# Patient Record
Sex: FEMALE | Race: WHITE | NOT HISPANIC OR LATINO | URBAN - METROPOLITAN AREA
[De-identification: names, ages, dates, MRNs, and addresses within clinical notes are randomized per-mention and may not be internally consistent; named-entity substitution may affect disease eponyms.]

---

## 2022-03-21 ENCOUNTER — OUTPATIENT (OUTPATIENT)
Dept: OUTPATIENT SERVICES | Facility: HOSPITAL | Age: 27
LOS: 1 days | Discharge: HOME | End: 2022-03-21

## 2022-03-21 DIAGNOSIS — N97.9 FEMALE INFERTILITY, UNSPECIFIED: ICD-10-CM

## 2023-10-19 PROBLEM — R11.2 NAUSEA AND VOMITING: Status: ACTIVE | Noted: 2023-10-19

## 2023-10-19 PROBLEM — N39.0 UTI (URINARY TRACT INFECTION): Status: ACTIVE | Noted: 2023-10-19

## 2023-10-19 PROBLEM — Z91.89 AT RISK FOR GENETIC DISORDER: Status: ACTIVE | Noted: 2023-10-19

## 2023-10-19 PROBLEM — N92.6 IRREGULAR MENSTRUAL CYCLE: Status: ACTIVE | Noted: 2023-10-19

## 2023-10-19 PROBLEM — R12 HEARTBURN IN PREGNANCY (HHS-HCC): Status: ACTIVE | Noted: 2023-10-19

## 2023-10-19 PROBLEM — A59.9 TRICHOMONIASIS: Status: ACTIVE | Noted: 2023-10-19

## 2023-10-19 PROBLEM — O44.40 LOW-LYING PLACENTA WITHOUT HEMORRHAGE (HHS-HCC): Status: ACTIVE | Noted: 2023-10-19

## 2023-10-19 PROBLEM — F12.20 TETRAHYDROCANNABINOL (THC) DEPENDENCE (MULTI): Status: ACTIVE | Noted: 2023-10-19

## 2023-10-19 PROBLEM — O26.899 HEARTBURN IN PREGNANCY (HHS-HCC): Status: ACTIVE | Noted: 2023-10-19

## 2023-10-19 RX ORDER — MEDROXYPROGESTERONE ACETATE 150 MG/ML
INJECTION, SUSPENSION INTRAMUSCULAR
COMMUNITY
Start: 2022-02-15 | End: 2024-03-13 | Stop reason: ALTCHOICE

## 2023-10-19 RX ORDER — IBUPROFEN 600 MG/1
TABLET ORAL
COMMUNITY
Start: 2022-02-07

## 2023-10-19 RX ORDER — ONDANSETRON 4 MG/1
1-2 TABLET, FILM COATED ORAL EVERY 8 HOURS PRN
COMMUNITY

## 2023-10-19 RX ORDER — CHOLECALCIFEROL (VITAMIN D3) 25 MCG
1 TABLET,CHEWABLE ORAL DAILY
COMMUNITY
Start: 2021-07-14

## 2023-10-27 ENCOUNTER — APPOINTMENT (OUTPATIENT)
Dept: OBSTETRICS AND GYNECOLOGY | Facility: CLINIC | Age: 28
End: 2023-10-27
Payer: COMMERCIAL

## 2024-03-02 ENCOUNTER — HOSPITAL ENCOUNTER (EMERGENCY)
Facility: HOSPITAL | Age: 29
Discharge: HOME | End: 2024-03-02
Payer: COMMERCIAL

## 2024-03-02 VITALS
RESPIRATION RATE: 20 BRPM | WEIGHT: 165 LBS | SYSTOLIC BLOOD PRESSURE: 127 MMHG | TEMPERATURE: 98.2 F | OXYGEN SATURATION: 100 % | DIASTOLIC BLOOD PRESSURE: 87 MMHG | HEART RATE: 96 BPM | BODY MASS INDEX: 29.23 KG/M2 | HEIGHT: 63 IN

## 2024-03-02 DIAGNOSIS — R10.13 EPIGASTRIC ABDOMINAL PAIN: Primary | ICD-10-CM

## 2024-03-02 LAB
APPEARANCE UR: ABNORMAL
BACTERIA #/AREA URNS AUTO: ABNORMAL /HPF
BILIRUB UR STRIP.AUTO-MCNC: ABNORMAL MG/DL
COLOR UR: ABNORMAL
GLUCOSE UR STRIP.AUTO-MCNC: NEGATIVE MG/DL
HCG UR QL IA.RAPID: NEGATIVE
HOLD SPECIMEN: 293
KETONES UR STRIP.AUTO-MCNC: NEGATIVE MG/DL
LEUKOCYTE ESTERASE UR QL STRIP.AUTO: NEGATIVE
MUCOUS THREADS #/AREA URNS AUTO: ABNORMAL /LPF
NITRITE UR QL STRIP.AUTO: NEGATIVE
PH UR STRIP.AUTO: 5 [PH]
PROT UR STRIP.AUTO-MCNC: ABNORMAL MG/DL
RBC # UR STRIP.AUTO: ABNORMAL /UL
RBC #/AREA URNS AUTO: ABNORMAL /HPF
SP GR UR STRIP.AUTO: 1.04
SQUAMOUS #/AREA URNS AUTO: ABNORMAL /HPF
UROBILINOGEN UR STRIP.AUTO-MCNC: <2 MG/DL
WBC #/AREA URNS AUTO: ABNORMAL /HPF

## 2024-03-02 PROCEDURE — 2500000001 HC RX 250 WO HCPCS SELF ADMINISTERED DRUGS (ALT 637 FOR MEDICARE OP): Performed by: PHYSICIAN ASSISTANT

## 2024-03-02 PROCEDURE — 81025 URINE PREGNANCY TEST: CPT | Performed by: PHYSICIAN ASSISTANT

## 2024-03-02 PROCEDURE — 81001 URINALYSIS AUTO W/SCOPE: CPT | Performed by: PHYSICIAN ASSISTANT

## 2024-03-02 PROCEDURE — 99283 EMERGENCY DEPT VISIT LOW MDM: CPT

## 2024-03-02 RX ORDER — LIDOCAINE HYDROCHLORIDE 20 MG/ML
15 SOLUTION OROPHARYNGEAL ONCE
Status: COMPLETED | OUTPATIENT
Start: 2024-03-02 | End: 2024-03-02

## 2024-03-02 RX ORDER — ALUMINUM HYDROXIDE, MAGNESIUM HYDROXIDE, AND SIMETHICONE 1200; 120; 1200 MG/30ML; MG/30ML; MG/30ML
15 SUSPENSION ORAL EVERY 6 HOURS PRN
Qty: 355 ML | Refills: 0 | Status: SHIPPED | OUTPATIENT
Start: 2024-03-02 | End: 2024-03-13 | Stop reason: ALTCHOICE

## 2024-03-02 RX ORDER — PANTOPRAZOLE SODIUM 40 MG/1
40 TABLET, DELAYED RELEASE ORAL
Qty: 10 TABLET | Refills: 0 | Status: SHIPPED | OUTPATIENT
Start: 2024-03-02 | End: 2024-03-13 | Stop reason: ALTCHOICE

## 2024-03-02 RX ORDER — ALUMINUM HYDROXIDE, MAGNESIUM HYDROXIDE, AND SIMETHICONE 1200; 120; 1200 MG/30ML; MG/30ML; MG/30ML
30 SUSPENSION ORAL ONCE
Status: COMPLETED | OUTPATIENT
Start: 2024-03-02 | End: 2024-03-02

## 2024-03-02 RX ADMIN — ALUMINUM HYDROXIDE, MAGNESIUM HYDROXIDE, AND DIMETHICONE 30 ML: 200; 20; 200 SUSPENSION ORAL at 13:59

## 2024-03-02 RX ADMIN — LIDOCAINE HYDROCHLORIDE 15 ML: 20 SOLUTION ORAL at 13:59

## 2024-03-02 ASSESSMENT — LIFESTYLE VARIABLES
EVER FELT BAD OR GUILTY ABOUT YOUR DRINKING: NO
EVER HAD A DRINK FIRST THING IN THE MORNING TO STEADY YOUR NERVES TO GET RID OF A HANGOVER: NO
HAVE PEOPLE ANNOYED YOU BY CRITICIZING YOUR DRINKING: NO
HAVE YOU EVER FELT YOU SHOULD CUT DOWN ON YOUR DRINKING: NO

## 2024-03-02 ASSESSMENT — PAIN DESCRIPTION - LOCATION: LOCATION: ABDOMEN

## 2024-03-02 ASSESSMENT — COLUMBIA-SUICIDE SEVERITY RATING SCALE - C-SSRS
1. IN THE PAST MONTH, HAVE YOU WISHED YOU WERE DEAD OR WISHED YOU COULD GO TO SLEEP AND NOT WAKE UP?: NO
6. HAVE YOU EVER DONE ANYTHING, STARTED TO DO ANYTHING, OR PREPARED TO DO ANYTHING TO END YOUR LIFE?: NO
2. HAVE YOU ACTUALLY HAD ANY THOUGHTS OF KILLING YOURSELF?: NO

## 2024-03-02 ASSESSMENT — PAIN SCALES - GENERAL
PAINLEVEL_OUTOF10: 2
PAINLEVEL_OUTOF10: 5 - MODERATE PAIN

## 2024-03-02 ASSESSMENT — PAIN - FUNCTIONAL ASSESSMENT: PAIN_FUNCTIONAL_ASSESSMENT: 0-10

## 2024-03-02 ASSESSMENT — PAIN DESCRIPTION - PAIN TYPE: TYPE: ACUTE PAIN

## 2024-03-02 ASSESSMENT — PAIN DESCRIPTION - ORIENTATION: ORIENTATION: LEFT

## 2024-03-02 NOTE — ED PROVIDER NOTES
HPI   Chief Complaint   Patient presents with    Abdominal Pain     C/O left upper quadrant pain and nausea.       History of present illness: 28-year-old female complains of abdominal pain for 1 week.  Says the pain is left upper quadrant and burning.  She took some antiacids yesterday offering some relief.  Has occasional nausea with none at this time.  Occasionally the pain radiates to her left back.  Denies any dysuria, polyuria, veg bleeding or vaginal discharge, diarrhea, constipation, fevers, chills, sweats.  Has also been taking Tylenol intermittently offering some relief of pain.  Patient states her pain began after eating a seafood boil.  She is currently on her menstrual cycle.    Review of systems: Constitutional, eye, ENT, cardiovascular, respiratory, gastrointestinal, genitourinary, neurologic, musculoskeletal, dermatologic, hematologic, endocrine systems were evaluated and were negative unless otherwise specified in history of present illness.    Medications: Reviewed and per nursing note.    Family history: Denies relevant medical conditions.    Past medical history: None per patient    Social history: Denies tobacco, alcohol, drug use.      Physical exam:    Appearance: Well-developed, well-nourished, nontoxic-appearing, alert and oriented x3. Talking in complete sentences.    HEENT:  Head normocephalic atraumatic, extraocular movements intact, pupils equal round reactive to light, mucous membranes are moist and pink.    NECK:  Nml Inspection, no meningismus, no thyromegaly, no lymphadenopathy, no JVD, trachea is midline.    Respiratory: Clear to auscultation bilaterally with normal bilateral excursion. No wheezes, rhonchi, crackles.    Cardiovascular: Regular rate and rhythm, no murmurs, rubs or gallops. Pulses 2+ symmetrically in the dorsalis pedis and radial pulses.    Abdomen/GI: Slight epigastric tenderness with no rebound guarding or rigidity.  Soft, nondistended, normal bowel sounds x4. No  masses or organomegaly.    :  No CVA tenderness    Neuro:  Oriented x 3, Speech Clear, cranial nerves grossly intact. Normal sensation to light touch in all 4 extremities.    Musculoskeletal: Patient spontaneously moves all 4 extremities.    Skin:  No cyanosis, clubbing, edema, open wounds, or rashes.                          No data recorded                   Patient History   Past Medical History:   Diagnosis Date    Other specified health status     No pertinent past medical history     Past Surgical History:   Procedure Laterality Date    OTHER SURGICAL HISTORY  07/27/2021    No history of surgery     No family history on file.  Social History     Tobacco Use    Smoking status: Not on file    Smokeless tobacco: Not on file   Substance Use Topics    Alcohol use: Not on file    Drug use: Not on file       Physical Exam   ED Triage Vitals [03/02/24 1255]   Temperature Heart Rate Respirations BP   36.8 °C (98.2 °F) 96 20 127/87      Pulse Ox Temp Source Heart Rate Source Patient Position   100 % Tympanic -- Sitting      BP Location FiO2 (%)     Left arm --       Physical Exam    ED Course & MDM   Diagnoses as of 03/02/24 1558   Epigastric abdominal pain       Medical Decision Making  Labs Reviewed  URINALYSIS WITH REFLEX CULTURE AND MICROSCOPIC - Abnormal     Color, Urine                  Roopa (*)               Appearance, Urine             Hazy (*)               Specific Gravity, Urine       1.041 (*)               pH, Urine                     5.0                    Protein, Urine                100 (2+) (*)               Glucose, Urine                NEGATIVE                Blood, Urine                  LARGE (3+) (*)               Ketones, Urine                NEGATIVE                Bilirubin, Urine              SMALL (1+) (*)               Urobilinogen, Urine           <2.0                   Nitrite, Urine                NEGATIVE                Leukocyte Esterase, Urine     NEGATIVE             URINALYSIS  MICROSCOPIC WITH REFLEX CULTURE - Abnormal     WBC, Urine                    1-5                    RBC, Urine                    11-20 (*)               Squamous Epithelial Cells, Urine   10-25 (FEW)                Bacteria, Urine               1+ (*)                 Mucus, Urine                  4+                  HCG, URINE, QUALITATIVE - Normal     HCG, Urine                    NEGATIVE             URINALYSIS WITH REFLEX CULTURE AND MICROSCOPIC         Narrative: The following orders were created for panel order Urinalysis with Reflex Culture and Microscopic.                  Procedure                               Abnormality         Status                                     ---------                               -----------         ------                                     Urinalysis with Reflex C...[[id number]]  Abnormal            Final result                               Extra Urine Gray Tube[[id number]]                            In process                                                   Please view results for these tests on the individual orders.  EXTRA URINE GRAY TUBE      Patient presents with abdominal pain in the epigastric region.  Differential diagnosis gastritis, GERD, gastric ulcer, pancreatitis, cholecystitis, appendicitis, bowel obstruction, coronary syndrome.  Patient states it is a burning pain left upper quadrant improved with antiacids.  Slight epigastric tenderness.  Afebrile nontoxic-appearing.    hCG negative.  Urinalysis shows contamination from her menstrual cycle .  Less likely urinary tract infection.  Patient given GI cocktail with complete resolution of symptoms.  Presentation most likely gastritis or GERD.  Given prescription for PPI and Maalox.  Will need to follow-up with her primary provider.  Serial abdominal exam was performed at 1550 revealing no tenderness.  She is tolerating by mouth.    Patient will be discharged to home with prescription.  Patient is educated in  signs and symptoms of worsening symptoms and reasons to come back to the emergency department.  Will need to follow up with primary care provider.  Patient does not report social determinants of health impacting ability to obtain care that is needed.  Patient agrees with plan.    This is a transcription.  Text was reviewed for errors, but some transcription errors may remain.  Please call for any questions.          Procedure  Procedures     Dom Devi PA-C  03/02/24 1552

## 2024-03-07 ENCOUNTER — APPOINTMENT (OUTPATIENT)
Dept: RADIOLOGY | Facility: HOSPITAL | Age: 29
End: 2024-03-07
Payer: COMMERCIAL

## 2024-03-07 ENCOUNTER — HOSPITAL ENCOUNTER (EMERGENCY)
Facility: HOSPITAL | Age: 29
Discharge: HOME | End: 2024-03-07
Attending: EMERGENCY MEDICINE
Payer: COMMERCIAL

## 2024-03-07 VITALS
DIASTOLIC BLOOD PRESSURE: 70 MMHG | BODY MASS INDEX: 29.23 KG/M2 | WEIGHT: 165 LBS | HEIGHT: 63 IN | SYSTOLIC BLOOD PRESSURE: 122 MMHG | RESPIRATION RATE: 20 BRPM | HEART RATE: 72 BPM | OXYGEN SATURATION: 98 % | TEMPERATURE: 98.6 F

## 2024-03-07 DIAGNOSIS — K81.9 CHOLECYSTITIS: Primary | ICD-10-CM

## 2024-03-07 PROBLEM — K81.1 CHRONIC CHOLECYSTITIS: Status: ACTIVE | Noted: 2024-03-07

## 2024-03-07 LAB
ALBUMIN SERPL BCP-MCNC: 3.9 G/DL (ref 3.4–5)
ALP SERPL-CCNC: 52 U/L (ref 33–110)
ALT SERPL W P-5'-P-CCNC: 26 U/L (ref 7–45)
ANION GAP SERPL CALC-SCNC: 9 MMOL/L (ref 10–20)
APPEARANCE UR: ABNORMAL
AST SERPL W P-5'-P-CCNC: 16 U/L (ref 9–39)
BASOPHILS # BLD AUTO: 0.02 X10*3/UL (ref 0–0.1)
BASOPHILS NFR BLD AUTO: 0.3 %
BILIRUB SERPL-MCNC: 0.3 MG/DL (ref 0–1.2)
BILIRUB UR STRIP.AUTO-MCNC: NEGATIVE MG/DL
BUN SERPL-MCNC: 13 MG/DL (ref 6–23)
CALCIUM SERPL-MCNC: 8.7 MG/DL (ref 8.6–10.3)
CHLORIDE SERPL-SCNC: 107 MMOL/L (ref 98–107)
CO2 SERPL-SCNC: 26 MMOL/L (ref 21–32)
COLOR UR: YELLOW
CREAT SERPL-MCNC: 0.69 MG/DL (ref 0.5–1.05)
EGFRCR SERPLBLD CKD-EPI 2021: >90 ML/MIN/1.73M*2
EOSINOPHIL # BLD AUTO: 0.1 X10*3/UL (ref 0–0.7)
EOSINOPHIL NFR BLD AUTO: 1.7 %
ERYTHROCYTE [DISTWIDTH] IN BLOOD BY AUTOMATED COUNT: 12.6 % (ref 11.5–14.5)
GLUCOSE SERPL-MCNC: 109 MG/DL (ref 74–99)
GLUCOSE UR STRIP.AUTO-MCNC: NEGATIVE MG/DL
HCG UR QL IA.RAPID: NEGATIVE
HCT VFR BLD AUTO: 37.5 % (ref 36–46)
HGB BLD-MCNC: 12.7 G/DL (ref 12–16)
HOLD SPECIMEN: 293
IMM GRANULOCYTES # BLD AUTO: 0.01 X10*3/UL (ref 0–0.7)
IMM GRANULOCYTES NFR BLD AUTO: 0.2 % (ref 0–0.9)
KETONES UR STRIP.AUTO-MCNC: NEGATIVE MG/DL
LEUKOCYTE ESTERASE UR QL STRIP.AUTO: NEGATIVE
LIPASE SERPL-CCNC: 44 U/L (ref 9–82)
LYMPHOCYTES # BLD AUTO: 1.72 X10*3/UL (ref 1.2–4.8)
LYMPHOCYTES NFR BLD AUTO: 30 %
MCH RBC QN AUTO: 29.4 PG (ref 26–34)
MCHC RBC AUTO-ENTMCNC: 33.9 G/DL (ref 32–36)
MCV RBC AUTO: 87 FL (ref 80–100)
MONOCYTES # BLD AUTO: 0.27 X10*3/UL (ref 0.1–1)
MONOCYTES NFR BLD AUTO: 4.7 %
NEUTROPHILS # BLD AUTO: 3.62 X10*3/UL (ref 1.2–7.7)
NEUTROPHILS NFR BLD AUTO: 63.1 %
NITRITE UR QL STRIP.AUTO: NEGATIVE
NRBC BLD-RTO: 0 /100 WBCS (ref 0–0)
PH UR STRIP.AUTO: 7 [PH]
PLATELET # BLD AUTO: 295 X10*3/UL (ref 150–450)
POTASSIUM SERPL-SCNC: 3.5 MMOL/L (ref 3.5–5.3)
PROT SERPL-MCNC: 6.7 G/DL (ref 6.4–8.2)
PROT UR STRIP.AUTO-MCNC: NEGATIVE MG/DL
RBC # BLD AUTO: 4.32 X10*6/UL (ref 4–5.2)
RBC # UR STRIP.AUTO: NEGATIVE /UL
SODIUM SERPL-SCNC: 138 MMOL/L (ref 136–145)
SP GR UR STRIP.AUTO: 1.03
UROBILINOGEN UR STRIP.AUTO-MCNC: 2 MG/DL
WBC # BLD AUTO: 5.7 X10*3/UL (ref 4.4–11.3)

## 2024-03-07 PROCEDURE — 74177 CT ABD & PELVIS W/CONTRAST: CPT

## 2024-03-07 PROCEDURE — 2550000001 HC RX 255 CONTRASTS: Performed by: EMERGENCY MEDICINE

## 2024-03-07 PROCEDURE — 85025 COMPLETE CBC W/AUTO DIFF WBC: CPT | Performed by: EMERGENCY MEDICINE

## 2024-03-07 PROCEDURE — 96361 HYDRATE IV INFUSION ADD-ON: CPT | Performed by: EMERGENCY MEDICINE

## 2024-03-07 PROCEDURE — 81025 URINE PREGNANCY TEST: CPT | Performed by: EMERGENCY MEDICINE

## 2024-03-07 PROCEDURE — C9113 INJ PANTOPRAZOLE SODIUM, VIA: HCPCS | Performed by: EMERGENCY MEDICINE

## 2024-03-07 PROCEDURE — 2500000001 HC RX 250 WO HCPCS SELF ADMINISTERED DRUGS (ALT 637 FOR MEDICARE OP): Performed by: STUDENT IN AN ORGANIZED HEALTH CARE EDUCATION/TRAINING PROGRAM

## 2024-03-07 PROCEDURE — 74177 CT ABD & PELVIS W/CONTRAST: CPT | Mod: FOREIGN READ | Performed by: RADIOLOGY

## 2024-03-07 PROCEDURE — 76705 ECHO EXAM OF ABDOMEN: CPT

## 2024-03-07 PROCEDURE — 96375 TX/PRO/DX INJ NEW DRUG ADDON: CPT | Performed by: EMERGENCY MEDICINE

## 2024-03-07 PROCEDURE — 76705 ECHO EXAM OF ABDOMEN: CPT | Mod: FOREIGN READ | Performed by: RADIOLOGY

## 2024-03-07 PROCEDURE — 2500000004 HC RX 250 GENERAL PHARMACY W/ HCPCS (ALT 636 FOR OP/ED): Performed by: EMERGENCY MEDICINE

## 2024-03-07 PROCEDURE — 36415 COLL VENOUS BLD VENIPUNCTURE: CPT | Performed by: EMERGENCY MEDICINE

## 2024-03-07 PROCEDURE — 99285 EMERGENCY DEPT VISIT HI MDM: CPT | Mod: 25 | Performed by: EMERGENCY MEDICINE

## 2024-03-07 PROCEDURE — 84075 ASSAY ALKALINE PHOSPHATASE: CPT | Performed by: EMERGENCY MEDICINE

## 2024-03-07 PROCEDURE — 83690 ASSAY OF LIPASE: CPT | Performed by: EMERGENCY MEDICINE

## 2024-03-07 PROCEDURE — 81003 URINALYSIS AUTO W/O SCOPE: CPT | Performed by: EMERGENCY MEDICINE

## 2024-03-07 PROCEDURE — 96374 THER/PROPH/DIAG INJ IV PUSH: CPT | Performed by: EMERGENCY MEDICINE

## 2024-03-07 RX ORDER — KETOROLAC TROMETHAMINE 30 MG/ML
15 INJECTION, SOLUTION INTRAMUSCULAR; INTRAVENOUS ONCE
Status: COMPLETED | OUTPATIENT
Start: 2024-03-07 | End: 2024-03-07

## 2024-03-07 RX ORDER — IBUPROFEN 600 MG/1
600 TABLET ORAL ONCE
Status: COMPLETED | OUTPATIENT
Start: 2024-03-07 | End: 2024-03-07

## 2024-03-07 RX ORDER — ONDANSETRON HYDROCHLORIDE 2 MG/ML
4 INJECTION, SOLUTION INTRAVENOUS ONCE
Status: COMPLETED | OUTPATIENT
Start: 2024-03-07 | End: 2024-03-07

## 2024-03-07 RX ORDER — OXYCODONE HYDROCHLORIDE 5 MG/1
5 TABLET ORAL EVERY 8 HOURS PRN
Qty: 9 TABLET | Refills: 0 | Status: SHIPPED | OUTPATIENT
Start: 2024-03-07 | End: 2024-03-10

## 2024-03-07 RX ORDER — PANTOPRAZOLE SODIUM 40 MG/10ML
40 INJECTION, POWDER, LYOPHILIZED, FOR SOLUTION INTRAVENOUS ONCE
Status: COMPLETED | OUTPATIENT
Start: 2024-03-07 | End: 2024-03-07

## 2024-03-07 RX ORDER — AMOXICILLIN AND CLAVULANATE POTASSIUM 875; 125 MG/1; MG/1
875 TABLET, FILM COATED ORAL EVERY 12 HOURS
Qty: 14 TABLET | Refills: 0 | Status: SHIPPED | OUTPATIENT
Start: 2024-03-07 | End: 2024-03-14

## 2024-03-07 RX ADMIN — IBUPROFEN 600 MG: 600 TABLET, FILM COATED ORAL at 11:39

## 2024-03-07 RX ADMIN — SODIUM CHLORIDE, POTASSIUM CHLORIDE, SODIUM LACTATE AND CALCIUM CHLORIDE 1000 ML: 600; 310; 30; 20 INJECTION, SOLUTION INTRAVENOUS at 05:29

## 2024-03-07 RX ADMIN — PANTOPRAZOLE SODIUM 40 MG: 40 INJECTION, POWDER, FOR SOLUTION INTRAVENOUS at 05:28

## 2024-03-07 RX ADMIN — ONDANSETRON 4 MG: 2 INJECTION INTRAMUSCULAR; INTRAVENOUS at 05:29

## 2024-03-07 RX ADMIN — KETOROLAC TROMETHAMINE 15 MG: 30 INJECTION, SOLUTION INTRAMUSCULAR; INTRAVENOUS at 05:29

## 2024-03-07 RX ADMIN — IOHEXOL 75 ML: 350 INJECTION, SOLUTION INTRAVENOUS at 06:29

## 2024-03-07 ASSESSMENT — ENCOUNTER SYMPTOMS
NAUSEA: 0
COUGH: 0
EYE DISCHARGE: 0
COLOR CHANGE: 0
SORE THROAT: 0
ABDOMINAL PAIN: 1
APPETITE CHANGE: 1
EYE REDNESS: 0
HEADACHES: 0
VOMITING: 0
FEVER: 0
DIARRHEA: 0
DYSURIA: 0
LIGHT-HEADEDNESS: 0
CHILLS: 0
SHORTNESS OF BREATH: 0
FREQUENCY: 0
ARTHRALGIAS: 0
BACK PAIN: 0
CONSTIPATION: 0

## 2024-03-07 ASSESSMENT — PAIN SCALES - GENERAL
PAINLEVEL_OUTOF10: 8
PAINLEVEL_OUTOF10: 2
PAINLEVEL_OUTOF10: 3
PAINLEVEL_OUTOF10: 0 - NO PAIN
PAINLEVEL_OUTOF10: 8

## 2024-03-07 ASSESSMENT — PAIN DESCRIPTION - PAIN TYPE
TYPE: ACUTE PAIN
TYPE: ACUTE PAIN

## 2024-03-07 ASSESSMENT — PAIN - FUNCTIONAL ASSESSMENT
PAIN_FUNCTIONAL_ASSESSMENT: 0-10

## 2024-03-07 ASSESSMENT — COLUMBIA-SUICIDE SEVERITY RATING SCALE - C-SSRS
6. HAVE YOU EVER DONE ANYTHING, STARTED TO DO ANYTHING, OR PREPARED TO DO ANYTHING TO END YOUR LIFE?: NO
1. IN THE PAST MONTH, HAVE YOU WISHED YOU WERE DEAD OR WISHED YOU COULD GO TO SLEEP AND NOT WAKE UP?: NO
2. HAVE YOU ACTUALLY HAD ANY THOUGHTS OF KILLING YOURSELF?: NO

## 2024-03-07 ASSESSMENT — PAIN DESCRIPTION - DESCRIPTORS: DESCRIPTORS: BURNING

## 2024-03-07 ASSESSMENT — PAIN DESCRIPTION - LOCATION
LOCATION: ABDOMEN

## 2024-03-07 NOTE — CONSULTS
GENERAL SURGERY CONSULT    Patient: Renita José  Room: AC03/03    Age: 28 y.o.   Gender: female  Attending: Margaret Chen DO    MRN: [id number]  Admission Date: 3/7/2024    PCP: No Assigned PCP Generic Provider, MD         SUBJECTIVE     Chief Complaint  Abdominal Pain       HPI  Renita José is a 28 y.o. female on day 0 of admission presenting with abdominal pain. She has been experiencing abdominal pain shortly after eating for the past week. Nothing has made this better and she notes that fatty foods such as cheese sticks make it worse. She has never had pain like this before. She has not had any fevers, chills, nausea, vomiting, or changes in her bowel habits. Her only complaint is epigastric pain at this time.     In the emergency room, she was found to have grossly normal labs including WBC count, LFTS, bilirubin. On CT there was some concern for a thickened gallbladder wall and questionable edema. Ultrasound demonstrated some sludge, a normal CBD diameter, large stone, and a 4mm wall. General surgery consulted to evaluate biliary pathology.      ROS  Review of Systems   Constitutional:  Positive for appetite change. Negative for chills and fever.   HENT:  Negative for congestion and sore throat.    Eyes:  Negative for discharge and redness.   Respiratory:  Negative for cough and shortness of breath.    Gastrointestinal:  Positive for abdominal pain. Negative for constipation, diarrhea, nausea and vomiting.   Endocrine: Negative for cold intolerance and heat intolerance.   Genitourinary:  Negative for dysuria and frequency.   Musculoskeletal:  Negative for arthralgias and back pain.   Skin:  Negative for color change and rash.   Neurological:  Negative for light-headedness and headaches.        HISTORY     Past Medical History:   Diagnosis Date    Other specified health status     No pertinent past medical history        Past Surgical History:   Procedure Laterality Date    OTHER SURGICAL  "HISTORY  07/27/2021    No history of surgery        No family history on file.     No Known Allergies     Social History     Tobacco Use   Smoking Status Not on file   Smokeless Tobacco Not on file        Social History     Substance and Sexual Activity   Alcohol Use None        HOME MEDICATIONS  Current Outpatient Medications   Medication Instructions    alum-mag hydroxide-simeth (Mylanta) 200-200-20 mg/5 mL oral suspension 15 mL, oral, Every 6 hours PRN    ibuprofen 600 mg tablet oral    medroxyPROGESTERone 150 mg/mL injection intramuscular, Every 3 months    ondansetron (Zofran) 4 mg tablet 1-2 tablets, oral, Every 8 hours PRN    pantoprazole (PROTONIX) 40 mg, oral, Daily before breakfast, Do not crush, chew, or split.    PNV151-iron-FA-o3-dha-epa-fish (Prenatal Multi-DHA,with vit K,) 27 mg iron-800 mcg-260 mg capsule 1 capsule, oral, Daily          OBJECTIVE   Last Recorded Vitals.  Blood pressure 130/62, pulse 74, temperature 37 °C (98.6 °F), resp. rate 20, height 1.6 m (5' 3\"), weight 74.8 kg (165 lb), last menstrual period 03/03/2024, SpO2 98 %.     PHYSICAL EXAM  Physical Exam  Constitutional:       General: She is not in acute distress.  HENT:      Head: Normocephalic and atraumatic.   Eyes:      Extraocular Movements: Extraocular movements intact.      Pupils: Pupils are equal, round, and reactive to light.   Cardiovascular:      Rate and Rhythm: Normal rate and regular rhythm.   Pulmonary:      Effort: Pulmonary effort is normal.      Breath sounds: Normal breath sounds.   Abdominal:      General: There is no distension.      Palpations: Abdomen is soft.      Tenderness: There is abdominal tenderness (to deep palpation of the RUQ. Some mild TTP in the epigastric region.). There is no guarding or rebound.   Musculoskeletal:         General: Normal range of motion.      Right lower leg: No edema.      Left lower leg: No edema.   Skin:     General: Skin is warm and dry.   Neurological:      General: No focal " deficit present.      Mental Status: She is alert and oriented to person, place, and time.            RESULTS   Labs  Results for orders placed or performed during the hospital encounter of 03/07/24 (from the past 24 hour(s))   CBC and Auto Differential   Result Value Ref Range    WBC 5.7 4.4 - 11.3 x10*3/uL    nRBC 0.0 0.0 - 0.0 /100 WBCs    RBC 4.32 4.00 - 5.20 x10*6/uL    Hemoglobin 12.7 12.0 - 16.0 g/dL    Hematocrit 37.5 36.0 - 46.0 %    MCV 87 80 - 100 fL    MCH 29.4 26.0 - 34.0 pg    MCHC 33.9 32.0 - 36.0 g/dL    RDW 12.6 11.5 - 14.5 %    Platelets 295 150 - 450 x10*3/uL    Neutrophils % 63.1 40.0 - 80.0 %    Immature Granulocytes %, Automated 0.2 0.0 - 0.9 %    Lymphocytes % 30.0 13.0 - 44.0 %    Monocytes % 4.7 2.0 - 10.0 %    Eosinophils % 1.7 0.0 - 6.0 %    Basophils % 0.3 0.0 - 2.0 %    Neutrophils Absolute 3.62 1.20 - 7.70 x10*3/uL    Immature Granulocytes Absolute, Automated 0.01 0.00 - 0.70 x10*3/uL    Lymphocytes Absolute 1.72 1.20 - 4.80 x10*3/uL    Monocytes Absolute 0.27 0.10 - 1.00 x10*3/uL    Eosinophils Absolute 0.10 0.00 - 0.70 x10*3/uL    Basophils Absolute 0.02 0.00 - 0.10 x10*3/uL   Comprehensive metabolic panel   Result Value Ref Range    Glucose 109 (H) 74 - 99 mg/dL    Sodium 138 136 - 145 mmol/L    Potassium 3.5 3.5 - 5.3 mmol/L    Chloride 107 98 - 107 mmol/L    Bicarbonate 26 21 - 32 mmol/L    Anion Gap 9 (L) 10 - 20 mmol/L    Urea Nitrogen 13 6 - 23 mg/dL    Creatinine 0.69 0.50 - 1.05 mg/dL    eGFR >90 >60 mL/min/1.73m*2    Calcium 8.7 8.6 - 10.3 mg/dL    Albumin 3.9 3.4 - 5.0 g/dL    Alkaline Phosphatase 52 33 - 110 U/L    Total Protein 6.7 6.4 - 8.2 g/dL    AST 16 9 - 39 U/L    Bilirubin, Total 0.3 0.0 - 1.2 mg/dL    ALT 26 7 - 45 U/L   Lipase   Result Value Ref Range    Lipase 44 9 - 82 U/L   hCG, Urine, Qualitative   Result Value Ref Range    HCG, Urine NEGATIVE NEGATIVE   Urinalysis with Reflex Culture and Microscopic   Result Value Ref Range    Color, Urine Yellow Straw,  Yellow    Appearance, Urine Hazy (N) Clear    Specific Gravity, Urine 1.026 1.005 - 1.035    pH, Urine 7.0 5.0, 5.5, 6.0, 6.5, 7.0, 7.5, 8.0    Protein, Urine NEGATIVE NEGATIVE mg/dL    Glucose, Urine NEGATIVE NEGATIVE mg/dL    Blood, Urine NEGATIVE NEGATIVE    Ketones, Urine NEGATIVE NEGATIVE mg/dL    Bilirubin, Urine NEGATIVE NEGATIVE    Urobilinogen, Urine 2.0 (N) <2.0 mg/dL    Nitrite, Urine NEGATIVE NEGATIVE    Leukocyte Esterase, Urine NEGATIVE NEGATIVE   Extra Urine Gray Tube   Result Value Ref Range    Extra Tube 293        Radiology Resutls  US gallbladder    Result Date: 3/7/2024  STUDY: Right Upper Quadrant Ultrasound; Completed Time: 3/7/2024 08:09 INDICATION: RUQ pain x1 week. COMPARISON: CT A/P 3/7/2024 ACCESSION NUMBER(S): [id number] ORDERING CLINICIAN: OG INGRAM TECHNIQUE: Ultrasound of the Right Upper Quadrant.  Multiple images were obtained. FINDINGS: LIVER: The liver demonstrates normal echogenicity. No hepatic mass is identified.  There is no intrahepatic biliary dilatation.   GALLBLADDER: The gallbladder contains sludge and a single stone.  The gallbladder wall is thickened measuring 0.4 cm.  There is no pericholecystic fluid.  Sonographic Slater's sign is reported to be negative.   BILE DUCTS: The common bile duct measures 0.2 cm.   PANCREAS: The pancreas is not well seen due to overlying bowel gas. .  RIGHT KIDNEY: The right kidney measures 9.3 cm in length.  Renal cortical echotexture is normal.  There is no hydronephrosis.  There are no stones.  There are no cysts.    Sludge and stone within the gallbladder.  Mild gallbladder wall thickening measuring 0.4 cm.  No biliary dilatation. Signed by Gregory Smith MD    CT abdomen pelvis w IV contrast    Result Date: 3/7/2024  STUDY: CT Abdomen and Pelvis with IV Contrast; 03/07/2024 6:47 AM INDICATION: LUQ/LLQ pain. COMPARISON: None. ACCESSION NUMBER(S): [id number] ORDERING CLINICIAN: OG INGRAM TECHNIQUE: CT of the abdomen and pelvis  was performed.  Contiguous axial images were obtained at 3 mm slice thickness through the abdomen and pelvis. Coronal and sagittal reconstructions at 3 mm slice thickness were performed.  Omnipaque 350 75 mL was administered intravenously.  FINDINGS: Partially visualized chest showing the heart to be of normal size. No pericardial effusion.  Lung bases are clear. Abdomen: No acute abnormality of the stomach is identified. The liver of normal size and contour. No intrahepatic ductal dilatation is identified.  Question mild edematous changes of the gallbladder.  Cholelithiasis.  The pancreas, spleen and adrenal glands are normal appearance. No acute renal process. No retroperitoneal adenopathy. No findings of abdominal aortic aneurysm. No bowel obstruction. No free air or free fluid within the abdomen. There is a normal-appearing appendix. Pelvis: CT examination of the pelvis shows no free fluid. No findings of adenopathy or mass. No inflammatory change or findings of free air. Skeleton: No acute bony process identified.    Cholelithiasis.  Question mild edema of the gallbladder.  Correlate with ultrasound examination if concern for cholecystitis. Signed by Len Tafoya MD         ASSESSMENT / PLAN   Active Problems:  There are no active Hospital Problems.     Chronic Cholecystitis    Plan  Patient with signs and symptoms of chronic cholecystitis. She does not have any acute symptoms such as fever, worsening pain, leukocytosis. This is supported by imaging which demonstrates more of a chronic picture of cholecystitis based on minimal fluid, wall thickness.  Will send patient home with 7 days of Augmentin to reduce inflammation.   Patient to call Dr. Mazariegos's office tomorrow to schedule an appointment to discuss surgical intervention. Will plan for laparoscopic cholecystectomy  Patient to return if condition worsens. Sent home with pain medication.    Discussed with Dr. Yair Toussaint DO PGY2  General  Surgery

## 2024-03-07 NOTE — PROGRESS NOTES
Patient care was taken over at sign out from previous physician. Please see initial provider note for details of H&P and ED work up thus far.    In brief, this is a 28 y.o. female presenting to the ED with abdominal pain, nausea, vomiting.  Has labs which were unremarkable including CBC, CMP, UA.  CT abdomen pelvis did show gallstones with mild edema of the gallbladder.  Patient signed out pending right upper quadrant ultrasound.  On reevaluation after receiving IV fluids, Toradol, Zofran, Protonix overnight, patient reports feeling much better.    Does have mild gallbladder wall thickening on US. Evaluated by general surgery and recommended outpatient antibiotics and prompt outpatient follow up. Patient comfortable with this plan. Tolerating PO intake. Given strict return precautions. Discharged in stable conditions.    Clinical Assessment:  Abdominal pain    Disposition:  Discharge to home    Margaret Chen DO  Emergency Medicine

## 2024-03-07 NOTE — ED PROVIDER NOTES
HPI   Chief Complaint   Patient presents with    Abdominal Pain       The patient is a 28-year-old female with negligible past medical history presenting with left upper/left lower quadrant abdominal pain.  Notes developing symptoms at the end of February, was evaluated for these in the emergency department on 3/2, was treated with oral medications and underwent urinalysis testing that was unremarkable and was discharged home with symptomatic improvement.  Notes that over the last 1-2 days symptoms have recurred, namely describes a burning sensation over her epigastric region that radiates to her left lower quadrant as well.  Notes some radiation through to her back on the left side.  Symptoms do not seem to be precipitated by any clear factors namely any specific food or fluid intake, denies any trauma to the area.  Denies any dysuria, hematuria.  Notes nausea without vomiting.  Endorses previous  but denies any other abdominal surgeries.  Denies any sensation of bloating.  Pain not worsened or improved by any medical factors.  Denies any radiation of the pain into her chest, denies any dyspnea or diaphoresis.  Denies any suspicious ingestions, denies any sick contacts.  Denies any recent travel.  Denies any dark black or bloody stools.  Denies any illicit substance/alcohol use on a regular basis.                          No data recorded                   Patient History   Past Medical History:   Diagnosis Date    Other specified health status     No pertinent past medical history     Past Surgical History:   Procedure Laterality Date    OTHER SURGICAL HISTORY  2021    No history of surgery     No family history on file.  Social History     Tobacco Use    Smoking status: Not on file    Smokeless tobacco: Not on file   Substance Use Topics    Alcohol use: Not on file    Drug use: Not on file       Physical Exam   ED Triage Vitals [24 0416]   Temperature Heart Rate Respirations BP   36.3 °C  (97.3 °F) 83 18 (!) 126/92      Pulse Ox Temp src Heart Rate Source Patient Position   98 % -- -- Sitting      BP Location FiO2 (%)     Left arm --       Physical Exam  Vitals and nursing note reviewed.   Constitutional:       General: She is not in acute distress.     Appearance: She is well-developed.   HENT:      Head: Normocephalic and atraumatic.   Eyes:      Conjunctiva/sclera: Conjunctivae normal.   Cardiovascular:      Rate and Rhythm: Normal rate and regular rhythm.      Heart sounds: No murmur heard.  Pulmonary:      Effort: Pulmonary effort is normal. No respiratory distress.      Breath sounds: Normal breath sounds.   Abdominal:      General: Bowel sounds are increased.      Palpations: Abdomen is soft.      Tenderness: There is abdominal tenderness in the left upper quadrant and left lower quadrant. There is no right CVA tenderness, left CVA tenderness or guarding. Negative signs include Slater's sign and McBurney's sign.      Hernia: No hernia is present.   Musculoskeletal:         General: No swelling.      Cervical back: Neck supple.   Skin:     General: Skin is warm and dry.      Capillary Refill: Capillary refill takes less than 2 seconds.   Neurological:      Mental Status: She is alert.   Psychiatric:         Mood and Affect: Mood normal.         ED Course & MDM   Diagnoses as of 03/07/24 1921   Cholecystitis       Medical Decision Making  Patient with epigastric abdominal pain radiating to her left flank, on examination patient is well-appearing with normal range vital signs but does have tenderness over the left upper/left lower quadrant of the abdomen, some left CVA tenderness as well.  Concern for colitis, gastroenteritis, gastritis, pancreatitis, pyelonephritis, renal stone.  No vaginal discharge or bleeding to suggest ectopic pregnancy, PID.  No Slater sign/right upper quadrant tenderness on exam to suggest biliary pathology, no right lower quadrant tenderness to suggest appendicitis.  Will  treat with IV fluids, antiemetics, analgesics, will obtain labs to assess for pancreatic/hepatobiliary function as well as CT abdomen and pelvis to assess for colitis/diverticulitis.  Will obtain urinalysis testing, urine pregnancy testing as well.  Disposition pending labs, imaging results, reassessment.    Patient's CT showing cholelithiasis with questionable edema of the gallbladder, will obtain ultrasound for further evaluation, CMP/ UA/ CBC otherwise unremarkable. Patient signed out to oncoming physician pending US results, reassessment. Signed out in stable condition.        Procedure  Procedures     Bobby Louis MD  03/07/24 7259

## 2024-03-13 ENCOUNTER — OFFICE VISIT (OUTPATIENT)
Dept: SURGERY | Facility: CLINIC | Age: 29
End: 2024-03-13
Payer: COMMERCIAL

## 2024-03-13 VITALS
SYSTOLIC BLOOD PRESSURE: 114 MMHG | WEIGHT: 175.6 LBS | BODY MASS INDEX: 31.11 KG/M2 | OXYGEN SATURATION: 97 % | DIASTOLIC BLOOD PRESSURE: 77 MMHG | HEIGHT: 63 IN | HEART RATE: 81 BPM

## 2024-03-13 DIAGNOSIS — K80.10 CALCULUS OF GALLBLADDER WITH CHRONIC CHOLECYSTITIS WITHOUT OBSTRUCTION: Primary | ICD-10-CM

## 2024-03-13 PROBLEM — K81.1 CHRONIC CHOLECYSTITIS: Status: RESOLVED | Noted: 2024-03-07 | Resolved: 2024-03-13

## 2024-03-13 PROCEDURE — 1036F TOBACCO NON-USER: CPT | Performed by: SURGERY

## 2024-03-13 PROCEDURE — 99203 OFFICE O/P NEW LOW 30 MIN: CPT | Performed by: SURGERY

## 2024-03-13 RX ORDER — DROSPIRENONE AND ETHINYL ESTRADIOL 0.02-3(28)
KIT ORAL
COMMUNITY
Start: 2023-12-28

## 2024-03-13 ASSESSMENT — ENCOUNTER SYMPTOMS
POLYPHAGIA: 0
HEMATURIA: 0
EYE PAIN: 0
EYE REDNESS: 0
HEADACHES: 0
WOUND: 0
BRUISES/BLEEDS EASILY: 0
WEAKNESS: 0
FATIGUE: 0
FEVER: 0
SHORTNESS OF BREATH: 0
FLANK PAIN: 0
DIARRHEA: 0
FREQUENCY: 0
MYALGIAS: 0
SPEECH DIFFICULTY: 0
CONSTIPATION: 1
DYSURIA: 0
VOMITING: 0
NAUSEA: 0
COUGH: 0
ABDOMINAL PAIN: 1
ARTHRALGIAS: 0
AGITATION: 0
CHILLS: 0
CONFUSION: 0

## 2024-03-13 NOTE — PATIENT INSTRUCTIONS
1.  Stay on a low grease, low-fat diet until after your gallbladder surgery.  See the gallbladder dietary sheet provided from the office.  2.  Finish the antibiotics as prescribed from the emergency room.  Please take until gone, even if you are feeling better.  3.  Surgery for removal of your gallbladder is scheduled for 3/21/2024.  Please, read the patient preoperative instruction sheet BEFORE your surgery.  Outpatient surgery will not require a Covid test. However, if you will be admitted to the hospital after your surgery, you will need a Covid test within 72 hours of your procedure.

## 2024-03-13 NOTE — LETTER
March 13, 2024     Patient: Renita José   YOB: 1995   Date of Visit: 3/13/2024       To Whom It May Concern:    Renita José was evaluated in my office today, 3/13/2024, for a medical issue requiring further intervention.  She is scheduled for surgery on 3/21/2024.  Due to postoperative lifting restrictions, she will need at least 2 weeks out of work.  At 2 weeks, she may return to work, but only on light duty (lifting less than 30 pounds) for 2 weeks.  If there is no light duty available, she will need to stay out of work for 4 weeks.  She will be following up in my office after surgery for routine postoperative care.    If you have any questions, please contact our office at [phone].       Sincerely,    Kelli Mazariegos MD    CC: No Recipients

## 2024-03-13 NOTE — PROGRESS NOTES
GENERAL SURGERY OFFICE NOTE    Patient: Renita José    Age: 28 y.o.   Gender: female    MRN: [id number]    PCP: No Assigned PCP Generic Provider, MD        SUBJECTIVE     Chief Complaint  New Patient Visit (Patient was referred by [facility] ED for gallbladder. Patient states that she has pain and discomfort after eating. )       LAURYN Maravilla is a 28-year-old -American female who presents to the office as a referral from the emergency room for evaluation of her gallbladder.  She is originally presented to the emergency room on 3/2/24 with epigastric burning pain which improved with a GI cocktail.  It was felt that her pain was likely gastritis.  She returned to the emergency room 3/7/2024 with worsening abdominal pain both in the left upper quadrant, left lower quadrant and epigastric region.  She did say some of her pain worsened after eating greasy and fatty foods (such as fried cheese sticks).  CT evaluation only identified a slightly thickened gallbladder wall.  Right upper quadrant ultrasound showed cholelithiasis with 4 mm gallbladder wall but no pericholecystic fluid.  She was given antibiotics and follow-up in my office.  She was initially feeling better on the antibiotics.  However, she had 1 episode of pain at 3 AM this morning.  She admits to eating chicken sausage yesterday afternoon.  She is not running any fevers.  No current nausea or vomiting.  She does note some constipation the last few weeks.    ROS  Review of Systems   Constitutional:  Negative for chills, fatigue and fever.   HENT:  Negative for congestion, ear pain and hearing loss.    Eyes:  Negative for pain and redness.   Respiratory:  Negative for cough and shortness of breath.    Cardiovascular:  Negative for chest pain and leg swelling.   Gastrointestinal:  Positive for abdominal pain and constipation. Negative for diarrhea, nausea and vomiting.   Endocrine: Negative for polyphagia.   Genitourinary:  Negative for dysuria, flank  "pain, frequency and hematuria.   Musculoskeletal:  Negative for arthralgias and myalgias.   Skin:  Negative for rash and wound.   Allergic/Immunologic: Negative for immunocompromised state.   Neurological:  Negative for speech difficulty, weakness and headaches.   Hematological:  Does not bruise/bleed easily.   Psychiatric/Behavioral:  Negative for agitation and confusion.           HISTORY     Past Medical History:   Diagnosis Date    Other specified health status     No pertinent past medical history        Past Surgical History:   Procedure Laterality Date     SECTION, CLASSIC      OTHER SURGICAL HISTORY  2021    No history of surgery        No family history on file.     No Known Allergies     Social History     Tobacco Use   Smoking Status Never   Smokeless Tobacco Never        Social History     Substance and Sexual Activity   Alcohol Use Not Currently        HOME MEDICATIONS  Current Outpatient Medications   Medication Instructions    amoxicillin-pot clavulanate (Augmentin) 875-125 mg tablet 875 mg, oral, Every 12 hours    ibuprofen 600 mg tablet oral    ondansetron (Zofran) 4 mg tablet 1-2 tablets, oral, Every 8 hours PRN    pantoprazole (PROTONIX) 40 mg, oral, Daily before breakfast, Do not crush, chew, or split.    PNV151-iron-FA-o3-dha-epa-fish (Prenatal Multi-DHA,with vit K,) 27 mg iron-800 mcg-260 mg capsule 1 capsule, oral, Daily    Vestura, 28, 3-0.02 mg tablet TAKE 1 TABLET BY MOUTH 1 TIME EACH DAY.          OBJECTIVE   Last Recorded Vitals.  Blood pressure 114/77, pulse 81, height 1.6 m (5' 3\"), weight 79.7 kg (175 lb 9.6 oz), last menstrual period 2024, SpO2 97 %.     PHYSICAL EXAM  Physical Exam   General: Well-developed, well-nourished and in no acute distress.  Head: Normocephalic. Atraumatic.  Neck/thyroid: Neck is supple.   Eyes: Pupils equal round and reactive to light. Conjunctiva normal.  ENMT: No masses or deformity of external nose. External ears without " masses.  Respiratory/Chest:  Normal respiratory effort.  Cardiovascular: Regular rate and rhythm.   Abdomen: Soft, nontender, nondistended. No hernias.  Nonfunctional umbilical ring site just above the umbilicus.  Musculoskeletal: Joints and limbs are grossly normal. Normal gait. Normal range of motion of major joints.  Neuro: Oriented to person, place and time. No obvious neurological deficit. Motor strength grossly normal.  Psych: Normal mood and affect.    RESULTS   Labs        Component  Ref Range & Units 6 d ago 2 yr ago 3 yr ago   Glucose  74 - 99 mg/dL 109 High  69 Low  88   Sodium  136 - 145 mmol/L 138 137 141   Potassium  3.5 - 5.3 mmol/L 3.5 4.1 CM 3.7   Chloride  98 - 107 mmol/L 107 109 High  106   Bicarbonate  21 - 32 mmol/L 26 21 24   Anion Gap  10 - 20 mmol/L 9 Low  11 15   Urea Nitrogen  6 - 23 mg/dL 13 11 11   Creatinine  0.50 - 1.05 mg/dL 0.69 0.78 0.87   eGFR  >60 mL/min/1.73m*2 >90     Comment: Calculations of estimated GFR are performed using the 2021 CKD-EPI Study Refit equation without the race variable for the IDMS-Traceable creatinine methods.  https://jasn.asnjournals.org/content/early/2021/09/22/ASN.4205549226   Calcium  8.6 - 10.3 mg/dL 8.7 8.9 8.7   Albumin  3.4 - 5.0 g/dL 3.9 3.5 4.7   Alkaline Phosphatase  33 - 110 U/L 52 192 High  59   Total Protein  6.4 - 8.2 g/dL 6.7 6.8 7.8   AST  9 - 39 U/L 16 17 CM 19   Bilirubin, Total  0.0 - 1.2 mg/dL 0.3 0.3 0.3   ALT  7 - 45 U/L 26 11 CM 18 CM   Comment: Patients treated with Sulfasalazine may generate falsely decreased results for ALT.   Resulting Agency [facility] MEDICAL                Component  Ref Range & Units 6 d ago  (3/7/24) 2 yr ago  (2/7/22) 2 yr ago  (2/4/22) 2 yr ago  (10/18/21) 2 yr ago  (7/27/21)   WBC  4.4 - 11.3 x10*3/uL 5.7 14.1 High  R 9.5 R  4.8 R   nRBC  0.0 - 0.0 /100 WBCs 0.0       RBC  4.00 - 5.20 x10*6/uL 4.32 3.33 Low  R 4.32 R  4.28 R   Hemoglobin  12.0 - 16.0 g/dL 12.7 10.1 Low  13.0 11.6  Low  13.2   Hematocrit  36.0 - 46.0 % 37.5 30.6 Low  39.0 35.1 Low  38.1   MCV  80 - 100 fL 87 92 90  89   MCH  26.0 - 34.0 pg 29.4       MCHC  32.0 - 36.0 g/dL 33.9 33.0 33.3  34.6   RDW  11.5 - 14.5 % 12.6 14.1 14.1  13.2   Platelets  150 - 450 x10*3/uL 295 227 R 265 R            Radiology Resutls  Right Upper Quadrant Ultrasound; Completed Time: 3/7/2024 08:09  INDICATION:  RUQ pain x1 week.  COMPARISON:  CT A/P 3/7/2024  ACCESSION NUMBER(S):  [id number]  ORDERING CLINICIAN:  OG INGRAM  TECHNIQUE:  Ultrasound of the Right Upper Quadrant.  Multiple images were  obtained.  FINDINGS:  LIVER:  The liver demonstrates normal echogenicity. No hepatic mass is  identified.  There is no intrahepatic biliary dilatation.       GALLBLADDER:  The gallbladder contains sludge and a single stone.  The gallbladder  wall is thickened measuring 0.4 cm.  There is no pericholecystic  fluid.  Sonographic Slater's sign is reported to be negative.       BILE DUCTS:  The common bile duct measures 0.2 cm.       PANCREAS:  The pancreas is not well seen due to overlying bowel gas. .     RIGHT KIDNEY:  The right kidney measures 9.3 cm in length.  Renal cortical  echotexture is normal.  There is no hydronephrosis.  There are no  stones.  There are no cysts.  IMPRESSION:  Sludge and stone within the gallbladder.  Mild gallbladder wall  thickening measuring 0.4 cm.  No biliary dilatation.    CT Abdomen and Pelvis with IV Contrast; 03/07/2024 6:47 AM  INDICATION:  LUQ/LLQ pain.  COMPARISON:  None.  ACCESSION NUMBER(S):  [id number]  ORDERING CLINICIAN:  OG INGRAM  TECHNIQUE:  CT of the abdomen and pelvis was performed.  Contiguous axial images  were obtained at 3 mm slice thickness through the abdomen and pelvis.   Coronal and sagittal reconstructions at 3 mm slice thickness were  performed.  Omnipaque 350 75 mL was administered intravenously.    FINDINGS:  Partially visualized chest showing the heart to be of normal size.  No  pericardial effusion.  Lung bases are clear.  Abdomen:  No acute abnormality of the stomach is identified.  The liver of normal size and contour. No intrahepatic ductal  dilatation is identified.  Question mild edematous changes of the  gallbladder.  Cholelithiasis.  The pancreas, spleen and adrenal glands  are normal appearance.   No acute renal process.   No retroperitoneal adenopathy. No findings of abdominal aortic  aneurysm.   No bowel obstruction. No free air or free fluid within the abdomen.  There is a normal-appearing appendix.  Pelvis:  CT examination of the pelvis shows no free fluid. No findings of  adenopathy or mass. No inflammatory change or findings of free air.  Skeleton:  No acute bony process identified.  IMPRESSION:  Cholelithiasis.  Question mild edema of the gallbladder.  Correlate  with ultrasound examination if concern for cholecystitis.      ASSESSMENT / PLAN   Diagnoses and all orders for this visit:  Calculus of gallbladder with chronic cholecystitis without obstruction  -     Case Request Operating Room: Laparoscopic cholecystectomy      Plan  1.  The benefits and risk of the laparoscopic cholecystectomy have been reviewed with the patient.  The nature of the procedure was reviewed with the patient which included the risk and benefits of having surgery.  Alternative treatment options were reviewed. The risks included, but not limited to, infection, bleeding, injury surrounding organs, possible need for open procedure, possible need for other procedure, hernia formation at any incision site, postoperative bile leak, allergic reaction to medication and death.  2.  Educational material has been given to the patient regarding gallbladder surgery. We also discussed postcholecystectomy urgency/diarrhea.  3.  The patient has been encouraged to stay on a low crease, low-fat diet to help control symptoms pre-and postoperatively.  4.  Surgery is scheduled for 3/21/2024.  5.  She is instructed  to finish the antibiotics as prescribed by the emergency room.  6.  She seems to have pain in multiple quadrants.  Discussed that her cholecystectomy is not likely to resolve her left-sided abdominal pain.  Her left-sided abdominal pain certainly could be due to some of the constipation that she has been experiencing lately.  If she has residual symptoms postoperatively, can consider endoscopic evaluation.  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  If light duty is available, she may return to work on light duty (lifting less than 30 pounds) at 2 weeks.  If no light duty is available, she will need to be out of work for 4 weeks.  Note provided for her job regarding surgery.      Kelli Mazariegos MD, FACS  [facility] General Surgery  [address] [place] [address];   [address]  [place]  [address] [phone]

## 2024-03-15 ENCOUNTER — APPOINTMENT (OUTPATIENT)
Dept: PREADMISSION TESTING | Facility: HOSPITAL | Age: 29
End: 2024-03-15
Payer: COMMERCIAL

## 2024-03-19 ENCOUNTER — ANESTHESIA EVENT (OUTPATIENT)
Dept: OPERATING ROOM | Facility: HOSPITAL | Age: 29
End: 2024-03-19
Payer: COMMERCIAL

## 2024-03-19 ENCOUNTER — PREP FOR PROCEDURE (OUTPATIENT)
Dept: SURGERY | Facility: HOSPITAL | Age: 29
End: 2024-03-19
Payer: COMMERCIAL

## 2024-03-19 NOTE — H&P
History Of Present Illness  Renita José is a 28 y.o. female presenting for laparoscopic cholecystectomy surgery. She originally presented to the emergency room on 3/2/24 with epigastric burning pain which improved with a GI cocktail.  It was felt that her pain was likely gastritis.  She returned to the emergency room 3/7/2024 with worsening abdominal pain both in the left upper quadrant, left lower quadrant and epigastric region.  She did say some of her pain worsened after eating greasy and fatty foods (such as fried cheese sticks).  CT evaluation only identified a slightly thickened gallbladder wall.  Right upper quadrant ultrasound showed cholelithiasis with 4 mm gallbladder wall but no pericholecystic fluid.  She was given antibiotics and follow-up in my office.  She was initially feeling better on the antibiotics.  However, she had 1 episode of pain at 3 AM this morning.  She admits to eating chicken sausage yesterday afternoon.  She is not running any fevers.  No current nausea or vomiting.  She does note some constipation the last few weeks.      Past Medical History  Past Medical History:   Diagnosis Date    Other specified health status     No pertinent past medical history       Surgical History  Past Surgical History:   Procedure Laterality Date     SECTION, CLASSIC      OTHER SURGICAL HISTORY  2021    No history of surgery        Social History  She reports that she has never smoked. She has never used smokeless tobacco. She reports that she does not currently use alcohol. She reports current drug use. Drug: Marijuana.    Family History  No family history on file.     Allergies  Patient has no known allergies.    Review of Systems   Constitutional:  Negative for chills, fatigue and fever.   HENT:  Negative for congestion, ear pain and hearing loss.    Eyes:  Negative for pain and redness.   Respiratory:  Negative for cough and shortness of breath.    Cardiovascular:  Negative for  chest pain and leg swelling.   Gastrointestinal:  Positive for abdominal pain and constipation. Negative for diarrhea, nausea and vomiting.   Endocrine: Negative for polyphagia.   Genitourinary:  Negative for dysuria, flank pain, frequency and hematuria.   Musculoskeletal:  Negative for arthralgias and myalgias.   Skin:  Negative for rash and wound.   Allergic/Immunologic: Negative for immunocompromised state.   Neurological:  Negative for speech difficulty, weakness and headaches.   Hematological:  Does not bruise/bleed easily.   Psychiatric/Behavioral:  Negative for agitation and confusion.      Physical Exam   General: Well-developed, well-nourished and in no acute distress.  Head: Normocephalic. Atraumatic.  Neck/thyroid: Neck is supple.   Eyes: Pupils equal round and reactive to light. Conjunctiva normal.  ENMT: No masses or deformity of external nose. External ears without masses.  Respiratory/Chest:  Normal respiratory effort.  Cardiovascular: Regular rate and rhythm.   Abdomen: Soft, nontender, nondistended. No hernias.  Nonfunctional umbilical ring site just above the umbilicus.  Musculoskeletal: Joints and limbs are grossly normal. Normal gait. Normal range of motion of major joints.  Neuro: Oriented to person, place and time. No obvious neurological deficit. Motor strength grossly normal.  Psych: Normal mood and affect.    Last Recorded Vitals  Last menstrual period 03/03/2024.    Relevant Results  Labs            Component  Ref Range & Units 6 d ago 2 yr ago 3 yr ago   Glucose  74 - 99 mg/dL 109 High  69 Low  88   Sodium  136 - 145 mmol/L 138 137 141   Potassium  3.5 - 5.3 mmol/L 3.5 4.1 CM 3.7   Chloride  98 - 107 mmol/L 107 109 High  106   Bicarbonate  21 - 32 mmol/L 26 21 24   Anion Gap  10 - 20 mmol/L 9 Low  11 15   Urea Nitrogen  6 - 23 mg/dL 13 11 11   Creatinine  0.50 - 1.05 mg/dL 0.69 0.78 0.87   eGFR  >60 mL/min/1.73m*2 >90       Comment: Calculations of estimated GFR are performed using the  2021 CKD-EPI Study Refit equation without the race variable for the IDMS-Traceable creatinine methods.  https://jasn.asnjournals.org/content/early/2021/09/22/ASN.0501770846   Calcium  8.6 - 10.3 mg/dL 8.7 8.9 8.7   Albumin  3.4 - 5.0 g/dL 3.9 3.5 4.7   Alkaline Phosphatase  33 - 110 U/L 52 192 High  59   Total Protein  6.4 - 8.2 g/dL 6.7 6.8 7.8   AST  9 - 39 U/L 16 17 CM 19   Bilirubin, Total  0.0 - 1.2 mg/dL 0.3 0.3 0.3   ALT  7 - 45 U/L 26 11 CM 18 CM   Comment: Patients treated with Sulfasalazine may generate falsely decreased results for ALT.   Resulting Agency [facility] MEDICAL                       Component  Ref Range & Units 6 d ago  (3/7/24) 2 yr ago  (2/7/22) 2 yr ago  (2/4/22) 2 yr ago  (10/18/21) 2 yr ago  (7/27/21)   WBC  4.4 - 11.3 x10*3/uL 5.7 14.1 High  R 9.5 R   4.8 R   nRBC  0.0 - 0.0 /100 WBCs 0.0           RBC  4.00 - 5.20 x10*6/uL 4.32 3.33 Low  R 4.32 R   4.28 R   Hemoglobin  12.0 - 16.0 g/dL 12.7 10.1 Low  13.0 11.6 Low  13.2   Hematocrit  36.0 - 46.0 % 37.5 30.6 Low  39.0 35.1 Low  38.1   MCV  80 - 100 fL 87 92 90   89   MCH  26.0 - 34.0 pg 29.4           MCHC  32.0 - 36.0 g/dL 33.9 33.0 33.3   34.6   RDW  11.5 - 14.5 % 12.6 14.1 14.1   13.2   Platelets  150 - 450 x10*3/uL 295 227 R 265 R                Radiology Resutls  Right Upper Quadrant Ultrasound; Completed Time: 3/7/2024 08:09  INDICATION:  RUQ pain x1 week.  COMPARISON:  CT A/P 3/7/2024  ACCESSION NUMBER(S):  [id number]  ORDERING CLINICIAN:  OG INGRAM  TECHNIQUE:  Ultrasound of the Right Upper Quadrant.  Multiple images were  obtained.  FINDINGS:  LIVER:  The liver demonstrates normal echogenicity. No hepatic mass is  identified.  There is no intrahepatic biliary dilatation.       GALLBLADDER:  The gallbladder contains sludge and a single stone.  The gallbladder  wall is thickened measuring 0.4 cm.  There is no pericholecystic  fluid.  Sonographic Slater's sign is reported to be negative.       BILE  DUCTS:  The common bile duct measures 0.2 cm.       PANCREAS:  The pancreas is not well seen due to overlying bowel gas. .     RIGHT KIDNEY:  The right kidney measures 9.3 cm in length.  Renal cortical  echotexture is normal.  There is no hydronephrosis.  There are no  stones.  There are no cysts.  IMPRESSION:  Sludge and stone within the gallbladder.  Mild gallbladder wall  thickening measuring 0.4 cm.  No biliary dilatation.     CT Abdomen and Pelvis with IV Contrast; 03/07/2024 6:47 AM  INDICATION:  LUQ/LLQ pain.  COMPARISON:  None.  ACCESSION NUMBER(S):  [id number]  ORDERING CLINICIAN:  OG INGRAM  TECHNIQUE:  CT of the abdomen and pelvis was performed.  Contiguous axial images  were obtained at 3 mm slice thickness through the abdomen and pelvis.   Coronal and sagittal reconstructions at 3 mm slice thickness were  performed.  Omnipaque 350 75 mL was administered intravenously.    FINDINGS:  Partially visualized chest showing the heart to be of normal size. No  pericardial effusion.  Lung bases are clear.  Abdomen:  No acute abnormality of the stomach is identified.  The liver of normal size and contour. No intrahepatic ductal  dilatation is identified.  Question mild edematous changes of the  gallbladder.  Cholelithiasis.  The pancreas, spleen and adrenal glands  are normal appearance.   No acute renal process.   No retroperitoneal adenopathy. No findings of abdominal aortic  aneurysm.   No bowel obstruction. No free air or free fluid within the abdomen.  There is a normal-appearing appendix.  Pelvis:  CT examination of the pelvis shows no free fluid. No findings of  adenopathy or mass. No inflammatory change or findings of free air.  Skeleton:  No acute bony process identified.  IMPRESSION:  Cholelithiasis.  Question mild edema of the gallbladder.  Correlate  with ultrasound examination if concern for cholecystitis.        Assessment/Plan   There are no diagnoses linked to this encounter.    Calculus of  gallbladder with chronic cholecystitis without obstruction  -     Case Request Operating Room: Laparoscopic cholecystectomy        Plan  1.  The benefits and risk of the laparoscopic cholecystectomy have been reviewed with the patient.  The nature of the procedure was reviewed with the patient which included the risk and benefits of having surgery.  Alternative treatment options were reviewed. The risks included, but not limited to, infection, bleeding, injury surrounding organs, possible need for open procedure, possible need for other procedure, hernia formation at any incision site, postoperative bile leak, allergic reaction to medication and death.  2.  Educational material has been given to the patient regarding gallbladder surgery. We also discussed postcholecystectomy urgency/diarrhea.  3.  The patient has been encouraged to stay on a low crease, low-fat diet to help control symptoms pre-and postoperatively.  4.  Surgery is scheduled for 3/21/2024.  5.  She is instructed to finish the antibiotics as prescribed by the emergency room.  6.  She seems to have pain in multiple quadrants.  Discussed that her cholecystectomy is not likely to resolve her left-sided abdominal pain.  Her left-sided abdominal pain certainly could be due to some of the constipation that she has been experiencing lately.  If she has residual symptoms postoperatively, can consider endoscopic evaluation.  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  If light duty is available, she may return to work on light duty (lifting less than 30 pounds) at 2 weeks.  If no light duty is available, she will need to be out of work for 4 weeks.  Note provided for her job regarding surgery.        Kelli Mazariegos MD, FACS   [facility]  [address] [place] [address];   [address]  [place]  [address] [phone]

## 2024-03-19 NOTE — H&P (VIEW-ONLY)
History Of Present Illness  Renita José is a 28 y.o. female presenting for laparoscopic cholecystectomy surgery. She originally presented to the emergency room on 3/2/24 with epigastric burning pain which improved with a GI cocktail.  It was felt that her pain was likely gastritis.  She returned to the emergency room 3/7/2024 with worsening abdominal pain both in the left upper quadrant, left lower quadrant and epigastric region.  She did say some of her pain worsened after eating greasy and fatty foods (such as fried cheese sticks).  CT evaluation only identified a slightly thickened gallbladder wall.  Right upper quadrant ultrasound showed cholelithiasis with 4 mm gallbladder wall but no pericholecystic fluid.  She was given antibiotics and follow-up in my office.  She was initially feeling better on the antibiotics.  However, she had 1 episode of pain at 3 AM this morning.  She admits to eating chicken sausage yesterday afternoon.  She is not running any fevers.  No current nausea or vomiting.  She does note some constipation the last few weeks.      Past Medical History  Past Medical History:   Diagnosis Date    Other specified health status     No pertinent past medical history       Surgical History  Past Surgical History:   Procedure Laterality Date     SECTION, CLASSIC      OTHER SURGICAL HISTORY  2021    No history of surgery        Social History  She reports that she has never smoked. She has never used smokeless tobacco. She reports that she does not currently use alcohol. She reports current drug use. Drug: Marijuana.    Family History  No family history on file.     Allergies  Patient has no known allergies.    Review of Systems   Constitutional:  Negative for chills, fatigue and fever.   HENT:  Negative for congestion, ear pain and hearing loss.    Eyes:  Negative for pain and redness.   Respiratory:  Negative for cough and shortness of breath.    Cardiovascular:  Negative for  chest pain and leg swelling.   Gastrointestinal:  Positive for abdominal pain and constipation. Negative for diarrhea, nausea and vomiting.   Endocrine: Negative for polyphagia.   Genitourinary:  Negative for dysuria, flank pain, frequency and hematuria.   Musculoskeletal:  Negative for arthralgias and myalgias.   Skin:  Negative for rash and wound.   Allergic/Immunologic: Negative for immunocompromised state.   Neurological:  Negative for speech difficulty, weakness and headaches.   Hematological:  Does not bruise/bleed easily.   Psychiatric/Behavioral:  Negative for agitation and confusion.      Physical Exam   General: Well-developed, well-nourished and in no acute distress.  Head: Normocephalic. Atraumatic.  Neck/thyroid: Neck is supple.   Eyes: Pupils equal round and reactive to light. Conjunctiva normal.  ENMT: No masses or deformity of external nose. External ears without masses.  Respiratory/Chest:  Normal respiratory effort.  Cardiovascular: Regular rate and rhythm.   Abdomen: Soft, nontender, nondistended. No hernias.  Nonfunctional umbilical ring site just above the umbilicus.  Musculoskeletal: Joints and limbs are grossly normal. Normal gait. Normal range of motion of major joints.  Neuro: Oriented to person, place and time. No obvious neurological deficit. Motor strength grossly normal.  Psych: Normal mood and affect.    Last Recorded Vitals  Last menstrual period 03/03/2024.    Relevant Results  Labs            Component  Ref Range & Units 6 d ago 2 yr ago 3 yr ago   Glucose  74 - 99 mg/dL 109 High  69 Low  88   Sodium  136 - 145 mmol/L 138 137 141   Potassium  3.5 - 5.3 mmol/L 3.5 4.1 CM 3.7   Chloride  98 - 107 mmol/L 107 109 High  106   Bicarbonate  21 - 32 mmol/L 26 21 24   Anion Gap  10 - 20 mmol/L 9 Low  11 15   Urea Nitrogen  6 - 23 mg/dL 13 11 11   Creatinine  0.50 - 1.05 mg/dL 0.69 0.78 0.87   eGFR  >60 mL/min/1.73m*2 >90       Comment: Calculations of estimated GFR are performed using the  2021 CKD-EPI Study Refit equation without the race variable for the IDMS-Traceable creatinine methods.  https://jasn.asnjournals.org/content/early/2021/09/22/ASN.2198247935   Calcium  8.6 - 10.3 mg/dL 8.7 8.9 8.7   Albumin  3.4 - 5.0 g/dL 3.9 3.5 4.7   Alkaline Phosphatase  33 - 110 U/L 52 192 High  59   Total Protein  6.4 - 8.2 g/dL 6.7 6.8 7.8   AST  9 - 39 U/L 16 17 CM 19   Bilirubin, Total  0.0 - 1.2 mg/dL 0.3 0.3 0.3   ALT  7 - 45 U/L 26 11 CM 18 CM   Comment: Patients treated with Sulfasalazine may generate falsely decreased results for ALT.   Resulting Agency [facility] MEDICAL                       Component  Ref Range & Units 6 d ago  (3/7/24) 2 yr ago  (2/7/22) 2 yr ago  (2/4/22) 2 yr ago  (10/18/21) 2 yr ago  (7/27/21)   WBC  4.4 - 11.3 x10*3/uL 5.7 14.1 High  R 9.5 R   4.8 R   nRBC  0.0 - 0.0 /100 WBCs 0.0           RBC  4.00 - 5.20 x10*6/uL 4.32 3.33 Low  R 4.32 R   4.28 R   Hemoglobin  12.0 - 16.0 g/dL 12.7 10.1 Low  13.0 11.6 Low  13.2   Hematocrit  36.0 - 46.0 % 37.5 30.6 Low  39.0 35.1 Low  38.1   MCV  80 - 100 fL 87 92 90   89   MCH  26.0 - 34.0 pg 29.4           MCHC  32.0 - 36.0 g/dL 33.9 33.0 33.3   34.6   RDW  11.5 - 14.5 % 12.6 14.1 14.1   13.2   Platelets  150 - 450 x10*3/uL 295 227 R 265 R                Radiology Resutls  Right Upper Quadrant Ultrasound; Completed Time: 3/7/2024 08:09  INDICATION:  RUQ pain x1 week.  COMPARISON:  CT A/P 3/7/2024  ACCESSION NUMBER(S):  [id number]  ORDERING CLINICIAN:  OG INGRAM  TECHNIQUE:  Ultrasound of the Right Upper Quadrant.  Multiple images were  obtained.  FINDINGS:  LIVER:  The liver demonstrates normal echogenicity. No hepatic mass is  identified.  There is no intrahepatic biliary dilatation.       GALLBLADDER:  The gallbladder contains sludge and a single stone.  The gallbladder  wall is thickened measuring 0.4 cm.  There is no pericholecystic  fluid.  Sonographic Slater's sign is reported to be negative.       BILE  DUCTS:  The common bile duct measures 0.2 cm.       PANCREAS:  The pancreas is not well seen due to overlying bowel gas. .     RIGHT KIDNEY:  The right kidney measures 9.3 cm in length.  Renal cortical  echotexture is normal.  There is no hydronephrosis.  There are no  stones.  There are no cysts.  IMPRESSION:  Sludge and stone within the gallbladder.  Mild gallbladder wall  thickening measuring 0.4 cm.  No biliary dilatation.     CT Abdomen and Pelvis with IV Contrast; 03/07/2024 6:47 AM  INDICATION:  LUQ/LLQ pain.  COMPARISON:  None.  ACCESSION NUMBER(S):  [id number]  ORDERING CLINICIAN:  OG INGRAM  TECHNIQUE:  CT of the abdomen and pelvis was performed.  Contiguous axial images  were obtained at 3 mm slice thickness through the abdomen and pelvis.   Coronal and sagittal reconstructions at 3 mm slice thickness were  performed.  Omnipaque 350 75 mL was administered intravenously.    FINDINGS:  Partially visualized chest showing the heart to be of normal size. No  pericardial effusion.  Lung bases are clear.  Abdomen:  No acute abnormality of the stomach is identified.  The liver of normal size and contour. No intrahepatic ductal  dilatation is identified.  Question mild edematous changes of the  gallbladder.  Cholelithiasis.  The pancreas, spleen and adrenal glands  are normal appearance.   No acute renal process.   No retroperitoneal adenopathy. No findings of abdominal aortic  aneurysm.   No bowel obstruction. No free air or free fluid within the abdomen.  There is a normal-appearing appendix.  Pelvis:  CT examination of the pelvis shows no free fluid. No findings of  adenopathy or mass. No inflammatory change or findings of free air.  Skeleton:  No acute bony process identified.  IMPRESSION:  Cholelithiasis.  Question mild edema of the gallbladder.  Correlate  with ultrasound examination if concern for cholecystitis.        Assessment/Plan   There are no diagnoses linked to this encounter.    Calculus of  gallbladder with chronic cholecystitis without obstruction  -     Case Request Operating Room: Laparoscopic cholecystectomy        Plan  1.  The benefits and risk of the laparoscopic cholecystectomy have been reviewed with the patient.  The nature of the procedure was reviewed with the patient which included the risk and benefits of having surgery.  Alternative treatment options were reviewed. The risks included, but not limited to, infection, bleeding, injury surrounding organs, possible need for open procedure, possible need for other procedure, hernia formation at any incision site, postoperative bile leak, allergic reaction to medication and death.  2.  Educational material has been given to the patient regarding gallbladder surgery. We also discussed postcholecystectomy urgency/diarrhea.  3.  The patient has been encouraged to stay on a low crease, low-fat diet to help control symptoms pre-and postoperatively.  4.  Surgery is scheduled for 3/21/2024.  5.  She is instructed to finish the antibiotics as prescribed by the emergency room.  6.  She seems to have pain in multiple quadrants.  Discussed that her cholecystectomy is not likely to resolve her left-sided abdominal pain.  Her left-sided abdominal pain certainly could be due to some of the constipation that she has been experiencing lately.  If she has residual symptoms postoperatively, can consider endoscopic evaluation.  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  If light duty is available, she may return to work on light duty (lifting less than 30 pounds) at 2 weeks.  If no light duty is available, she will need to be out of work for 4 weeks.  Note provided for her job regarding surgery.        Kelli Mazariegos MD, FACS   [facility]  [address] [place] [address];   [address]  [place]  [address] [phone]

## 2024-03-21 ENCOUNTER — APPOINTMENT (OUTPATIENT)
Dept: CARDIOLOGY | Facility: HOSPITAL | Age: 29
End: 2024-03-21
Payer: COMMERCIAL

## 2024-03-21 ENCOUNTER — ANESTHESIA (OUTPATIENT)
Dept: OPERATING ROOM | Facility: HOSPITAL | Age: 29
End: 2024-03-21
Payer: COMMERCIAL

## 2024-03-21 ENCOUNTER — HOSPITAL ENCOUNTER (OUTPATIENT)
Facility: HOSPITAL | Age: 29
Setting detail: OUTPATIENT SURGERY
Discharge: HOME | End: 2024-03-21
Attending: SURGERY | Admitting: SURGERY
Payer: COMMERCIAL

## 2024-03-21 ENCOUNTER — PHARMACY VISIT (OUTPATIENT)
Dept: PHARMACY | Facility: CLINIC | Age: 29
End: 2024-03-21
Payer: MEDICAID

## 2024-03-21 VITALS
SYSTOLIC BLOOD PRESSURE: 123 MMHG | RESPIRATION RATE: 17 BRPM | HEART RATE: 86 BPM | WEIGHT: 175 LBS | HEIGHT: 63 IN | OXYGEN SATURATION: 99 % | BODY MASS INDEX: 31.01 KG/M2 | DIASTOLIC BLOOD PRESSURE: 87 MMHG | TEMPERATURE: 97.9 F

## 2024-03-21 DIAGNOSIS — K80.10 CALCULUS OF GALLBLADDER WITH CHRONIC CHOLECYSTITIS WITHOUT OBSTRUCTION: Primary | ICD-10-CM

## 2024-03-21 LAB
ATRIAL RATE: 79 BPM
P AXIS: 50 DEGREES
PR INTERVAL: 149 MS
PREGNANCY TEST URINE, POC: NEGATIVE
Q ONSET: 249 MS
QRS COUNT: 13 BEATS
QRS DURATION: 102 MS
QT INTERVAL: 379 MS
QTC CALCULATION(BAZETT): 438 MS
QTC FREDERICIA: 417 MS
R AXIS: 28 DEGREES
T AXIS: 31 DEGREES
T OFFSET: 439 MS
VENTRICULAR RATE: 80 BPM

## 2024-03-21 PROCEDURE — 2500000004 HC RX 250 GENERAL PHARMACY W/ HCPCS (ALT 636 FOR OP/ED): Performed by: NURSE ANESTHETIST, CERTIFIED REGISTERED

## 2024-03-21 PROCEDURE — 3600000008 HC OR TIME - EACH INCREMENTAL 1 MINUTE - PROCEDURE LEVEL THREE: Performed by: SURGERY

## 2024-03-21 PROCEDURE — 7100000009 HC PHASE TWO TIME - INITIAL BASE CHARGE: Performed by: SURGERY

## 2024-03-21 PROCEDURE — 3600000003 HC OR TIME - INITIAL BASE CHARGE - PROCEDURE LEVEL THREE: Performed by: SURGERY

## 2024-03-21 PROCEDURE — 2500000001 HC RX 250 WO HCPCS SELF ADMINISTERED DRUGS (ALT 637 FOR MEDICARE OP): Performed by: ANESTHESIOLOGY

## 2024-03-21 PROCEDURE — 7100000002 HC RECOVERY ROOM TIME - EACH INCREMENTAL 1 MINUTE: Performed by: SURGERY

## 2024-03-21 PROCEDURE — 47562 LAPAROSCOPIC CHOLECYSTECTOMY: CPT | Performed by: SURGERY

## 2024-03-21 PROCEDURE — 7100000010 HC PHASE TWO TIME - EACH INCREMENTAL 1 MINUTE: Performed by: SURGERY

## 2024-03-21 PROCEDURE — 2500000004 HC RX 250 GENERAL PHARMACY W/ HCPCS (ALT 636 FOR OP/ED): Performed by: ANESTHESIOLOGY

## 2024-03-21 PROCEDURE — 2720000007 HC OR 272 NO HCPCS: Performed by: SURGERY

## 2024-03-21 PROCEDURE — 2500000004 HC RX 250 GENERAL PHARMACY W/ HCPCS (ALT 636 FOR OP/ED): Performed by: SURGERY

## 2024-03-21 PROCEDURE — RXMED WILLOW AMBULATORY MEDICATION CHARGE

## 2024-03-21 PROCEDURE — 2500000005 HC RX 250 GENERAL PHARMACY W/O HCPCS: Performed by: SURGERY

## 2024-03-21 PROCEDURE — 3700000001 HC GENERAL ANESTHESIA TIME - INITIAL BASE CHARGE: Performed by: SURGERY

## 2024-03-21 PROCEDURE — 88304 TISSUE EXAM BY PATHOLOGIST: CPT | Mod: TC,PORLAB | Performed by: SURGERY

## 2024-03-21 PROCEDURE — 2500000005 HC RX 250 GENERAL PHARMACY W/O HCPCS: Performed by: NURSE ANESTHETIST, CERTIFIED REGISTERED

## 2024-03-21 PROCEDURE — 96372 THER/PROPH/DIAG INJ SC/IM: CPT | Performed by: NURSE ANESTHETIST, CERTIFIED REGISTERED

## 2024-03-21 PROCEDURE — 2780000003 HC OR 278 NO HCPCS: Performed by: SURGERY

## 2024-03-21 PROCEDURE — 81025 URINE PREGNANCY TEST: CPT | Performed by: ANESTHESIOLOGY

## 2024-03-21 PROCEDURE — 93010 ELECTROCARDIOGRAM REPORT: CPT | Performed by: INTERNAL MEDICINE

## 2024-03-21 PROCEDURE — 7100000001 HC RECOVERY ROOM TIME - INITIAL BASE CHARGE: Performed by: SURGERY

## 2024-03-21 PROCEDURE — 93005 ELECTROCARDIOGRAM TRACING: CPT | Mod: 59

## 2024-03-21 PROCEDURE — 3700000002 HC GENERAL ANESTHESIA TIME - EACH INCREMENTAL 1 MINUTE: Performed by: SURGERY

## 2024-03-21 PROCEDURE — 88304 TISSUE EXAM BY PATHOLOGIST: CPT | Performed by: STUDENT IN AN ORGANIZED HEALTH CARE EDUCATION/TRAINING PROGRAM

## 2024-03-21 RX ORDER — ONDANSETRON HYDROCHLORIDE 2 MG/ML
4 INJECTION, SOLUTION INTRAVENOUS ONCE
Status: COMPLETED | OUTPATIENT
Start: 2024-03-21 | End: 2024-03-21

## 2024-03-21 RX ORDER — FAMOTIDINE 10 MG/ML
20 INJECTION INTRAVENOUS ONCE
Status: COMPLETED | OUTPATIENT
Start: 2024-03-21 | End: 2024-03-21

## 2024-03-21 RX ORDER — METOCLOPRAMIDE HYDROCHLORIDE 5 MG/ML
10 INJECTION INTRAMUSCULAR; INTRAVENOUS ONCE
Status: COMPLETED | OUTPATIENT
Start: 2024-03-21 | End: 2024-03-21

## 2024-03-21 RX ORDER — ONDANSETRON HYDROCHLORIDE 2 MG/ML
INJECTION, SOLUTION INTRAVENOUS AS NEEDED
Status: DISCONTINUED | OUTPATIENT
Start: 2024-03-21 | End: 2024-03-21

## 2024-03-21 RX ORDER — MIDAZOLAM HYDROCHLORIDE 1 MG/ML
INJECTION, SOLUTION INTRAMUSCULAR; INTRAVENOUS AS NEEDED
Status: DISCONTINUED | OUTPATIENT
Start: 2024-03-21 | End: 2024-03-21

## 2024-03-21 RX ORDER — HYDROCODONE BITARTRATE AND ACETAMINOPHEN 7.5; 325 MG/1; MG/1
1 TABLET ORAL EVERY 6 HOURS PRN
Qty: 10 TABLET | Refills: 0 | Status: SHIPPED | OUTPATIENT
Start: 2024-03-21 | End: 2024-04-05 | Stop reason: ALTCHOICE

## 2024-03-21 RX ORDER — FENTANYL CITRATE 50 UG/ML
INJECTION, SOLUTION INTRAMUSCULAR; INTRAVENOUS AS NEEDED
Status: DISCONTINUED | OUTPATIENT
Start: 2024-03-21 | End: 2024-03-21

## 2024-03-21 RX ORDER — LIDOCAINE HCL/PF 100 MG/5ML
SYRINGE (ML) INTRAVENOUS AS NEEDED
Status: DISCONTINUED | OUTPATIENT
Start: 2024-03-21 | End: 2024-03-21

## 2024-03-21 RX ORDER — CEFAZOLIN SODIUM 2 G/100ML
2 INJECTION, SOLUTION INTRAVENOUS ONCE
Status: COMPLETED | OUTPATIENT
Start: 2024-03-21 | End: 2024-03-21

## 2024-03-21 RX ORDER — ONDANSETRON HYDROCHLORIDE 2 MG/ML
4 INJECTION, SOLUTION INTRAVENOUS ONCE AS NEEDED
Status: DISCONTINUED | OUTPATIENT
Start: 2024-03-21 | End: 2024-03-21 | Stop reason: HOSPADM

## 2024-03-21 RX ORDER — ROCURONIUM BROMIDE 50 MG/5 ML
SYRINGE (ML) INTRAVENOUS AS NEEDED
Status: DISCONTINUED | OUTPATIENT
Start: 2024-03-21 | End: 2024-03-21

## 2024-03-21 RX ORDER — ACETAMINOPHEN 325 MG/1
975 TABLET ORAL ONCE
Status: COMPLETED | OUTPATIENT
Start: 2024-03-21 | End: 2024-03-21

## 2024-03-21 RX ORDER — KETOROLAC TROMETHAMINE 30 MG/ML
INJECTION, SOLUTION INTRAMUSCULAR; INTRAVENOUS AS NEEDED
Status: DISCONTINUED | OUTPATIENT
Start: 2024-03-21 | End: 2024-03-21

## 2024-03-21 RX ORDER — ESMOLOL HYDROCHLORIDE 10 MG/ML
INJECTION INTRAVENOUS AS NEEDED
Status: DISCONTINUED | OUTPATIENT
Start: 2024-03-21 | End: 2024-03-21

## 2024-03-21 RX ORDER — BUPIVACAINE HCL/EPINEPHRINE 0.5-1:200K
VIAL (ML) INJECTION AS NEEDED
Status: DISCONTINUED | OUTPATIENT
Start: 2024-03-21 | End: 2024-03-21 | Stop reason: HOSPADM

## 2024-03-21 RX ORDER — CEFAZOLIN 1 G/1
INJECTION, POWDER, FOR SOLUTION INTRAVENOUS AS NEEDED
Status: DISCONTINUED | OUTPATIENT
Start: 2024-03-21 | End: 2024-03-21

## 2024-03-21 RX ORDER — SODIUM CHLORIDE, SODIUM LACTATE, POTASSIUM CHLORIDE, CALCIUM CHLORIDE 600; 310; 30; 20 MG/100ML; MG/100ML; MG/100ML; MG/100ML
50 INJECTION, SOLUTION INTRAVENOUS CONTINUOUS
Status: DISCONTINUED | OUTPATIENT
Start: 2024-03-21 | End: 2024-03-21 | Stop reason: HOSPADM

## 2024-03-21 RX ORDER — PROPOFOL 10 MG/ML
INJECTION, EMULSION INTRAVENOUS AS NEEDED
Status: DISCONTINUED | OUTPATIENT
Start: 2024-03-21 | End: 2024-03-21

## 2024-03-21 RX ORDER — SODIUM CITRATE AND CITRIC ACID MONOHYDRATE 334; 500 MG/5ML; MG/5ML
30 SOLUTION ORAL ONCE
Status: COMPLETED | OUTPATIENT
Start: 2024-03-21 | End: 2024-03-21

## 2024-03-21 RX ADMIN — ESMOLOL HYDROCHLORIDE 20 MG: 10 INJECTION, SOLUTION INTRAVENOUS at 08:51

## 2024-03-21 RX ADMIN — CEFAZOLIN 2 G: 1 INJECTION, POWDER, FOR SOLUTION INTRAMUSCULAR; INTRAVENOUS at 07:56

## 2024-03-21 RX ADMIN — SUGAMMADEX 200 MG: 100 INJECTION, SOLUTION INTRAVENOUS at 09:30

## 2024-03-21 RX ADMIN — KETOROLAC TROMETHAMINE 30 MG: 30 INJECTION, SOLUTION INTRAMUSCULAR at 09:25

## 2024-03-21 RX ADMIN — HYDROMORPHONE HYDROCHLORIDE 0.2 MG: 0.2 INJECTION, SOLUTION INTRAMUSCULAR; INTRAVENOUS; SUBCUTANEOUS at 10:12

## 2024-03-21 RX ADMIN — ESMOLOL HYDROCHLORIDE 50 MG: 10 INJECTION, SOLUTION INTRAVENOUS at 09:30

## 2024-03-21 RX ADMIN — DEXAMETHASONE SODIUM PHOSPHATE 4 MG: 4 INJECTION INTRA-ARTICULAR; INTRALESIONAL; INTRAMUSCULAR; INTRAVENOUS; SOFT TISSUE at 08:23

## 2024-03-21 RX ADMIN — ACETAMINOPHEN 975 MG: 325 TABLET ORAL at 07:58

## 2024-03-21 RX ADMIN — SODIUM CHLORIDE, POTASSIUM CHLORIDE, SODIUM LACTATE AND CALCIUM CHLORIDE 50 ML/HR: 600; 310; 30; 20 INJECTION, SOLUTION INTRAVENOUS at 07:56

## 2024-03-21 RX ADMIN — FAMOTIDINE 20 MG: 10 INJECTION, SOLUTION INTRAVENOUS at 07:56

## 2024-03-21 RX ADMIN — CEFAZOLIN SODIUM 2 G: 2 INJECTION, SOLUTION INTRAVENOUS at 07:59

## 2024-03-21 RX ADMIN — SODIUM CITRATE AND CITRIC ACID MONOHYDRATE 30 ML: 500; 334 SOLUTION ORAL at 07:58

## 2024-03-21 RX ADMIN — Medication 50 MG: at 08:23

## 2024-03-21 RX ADMIN — METOCLOPRAMIDE 10 MG: 5 INJECTION, SOLUTION INTRAMUSCULAR; INTRAVENOUS at 07:56

## 2024-03-21 RX ADMIN — PROPOFOL 200 MG: 10 INJECTION, EMULSION INTRAVENOUS at 08:23

## 2024-03-21 RX ADMIN — ESMOLOL HYDROCHLORIDE 20 MG: 10 INJECTION, SOLUTION INTRAVENOUS at 08:30

## 2024-03-21 RX ADMIN — LIDOCAINE HYDROCHLORIDE 100 MG: 20 INJECTION, SOLUTION INTRAVENOUS at 08:23

## 2024-03-21 RX ADMIN — ONDANSETRON 4 MG: 2 INJECTION INTRAMUSCULAR; INTRAVENOUS at 09:04

## 2024-03-21 RX ADMIN — MIDAZOLAM 2 MG: 1 INJECTION INTRAMUSCULAR; INTRAVENOUS at 08:19

## 2024-03-21 RX ADMIN — DEXAMETHASONE SODIUM PHOSPHATE 8 MG: 4 INJECTION, SOLUTION INTRAMUSCULAR; INTRAVENOUS at 07:56

## 2024-03-21 RX ADMIN — FENTANYL CITRATE 100 MCG: 50 INJECTION INTRAMUSCULAR; INTRAVENOUS at 08:20

## 2024-03-21 RX ADMIN — ONDANSETRON 4 MG: 2 INJECTION INTRAMUSCULAR; INTRAVENOUS at 07:56

## 2024-03-21 SDOH — HEALTH STABILITY: MENTAL HEALTH: CURRENT SMOKER: 0

## 2024-03-21 ASSESSMENT — PAIN - FUNCTIONAL ASSESSMENT
PAIN_FUNCTIONAL_ASSESSMENT: 0-10

## 2024-03-21 ASSESSMENT — PAIN SCALES - GENERAL
PAINLEVEL_OUTOF10: 0 - NO PAIN
PAINLEVEL_OUTOF10: 0 - NO PAIN
PAINLEVEL_OUTOF10: 2
PAINLEVEL_OUTOF10: 0 - NO PAIN
PAINLEVEL_OUTOF10: 2
PAIN_LEVEL: 1
PAINLEVEL_OUTOF10: 5 - MODERATE PAIN
PAINLEVEL_OUTOF10: 0 - NO PAIN

## 2024-03-21 ASSESSMENT — PAIN DESCRIPTION - LOCATION: LOCATION: ABDOMEN

## 2024-03-21 NOTE — DISCHARGE INSTRUCTIONS
DISCHARGE INSTRUCTIONS    Patient: Renita José  Surgery Date: 3/21/2024    Age: 28 y.o.   Gender: female  Attending: Kelli Mazariegos MD    MRN: [id number]  OR Location: [facility] OR    PCP: No Assigned PCP Generic Provider, MD           SURGERY INFORMATION   Procedure performed: Procedure(s):  Laparoscopic cholecystectomy   Post-Op diagnosis: Post-op Diagnosis     * Calculus of gallbladder with chronic cholecystitis without obstruction [K80.10]   Surgeon:    * Kelli Mazariegos - Primary     ACTIVITY RESTRICTIONS   1.  Do not engage in sports, heavy work or lifting until cleared by Dr. Mazariegos.    2.  Do not lift/pull/push more than 10 pounds for 2 weeks.   3.  Do not do repetitive bending over/picking up objects off the floor, as this puts strain on your incisions on your abdomen.  4.  You may drive when off of narcotic pain medication.  5.  Continue wearing the compression stockings (PARMJIT hose) until you are walking at least 3 times per day.    BATHING   You may shower starting the day after your surgery.  You may use the Hibiclens soap (the liquid soap you used prior to surgery) at your surgical sites.  However, do not use this soap more than 3 times per week.    WOUND CARE / DRAIN CARE   Remove the Band-Aids before your shower.  Pat dry the Steri-Strips after your shower.  You do NOT need to cover the Steri-Strips after your shower unless there is bleeding or drainage.  2.   Allow the Steri-Strips to fall off on their own.  3.   Apply ice to your surgical area for 20 minutes 3 times a day to help with pain and swelling.  4.   You may also use a heating pad to your abdomen to help with pain, as a heating pad will reduce abdominal spasms.    MEDICATIONS   A narcotic pain medication has been prescribed.  Use this medication only AS NEEDED for severe pain.  2.   You may use Tylenol, or ibuprofen, in addition to, or instead of, your narcotic pain medication.  3.   Resume all home medications unless previously  discussed with your surgeon. Blood thinners can be restarted the day after your surgery unless there is significant bleeding.    DIET   Diet as tolerated. Stay on a low grease, low-fat diet for 2 weeks.  For the first 24 hours after surgery, it is recommended that you eat light meals.  Some nausea and vomiting is common for the first 24 hours after surgery.  Drink plenty of fluids.  Minimize your use of caffeinated beverages.    CALLL YOUR SURGEON IF:   Any evidence of infection at your sugical site which can include redness or drainage. Some clear or pinkish drainage is normal for a few days following surgery.  2.   Excessive bleeding from your surgical site. If there is a small amount of bleeding, apply pressure for 20 minutes, then recheck the wound. If the bleeding does not stop, please call your surgeon's office.  3.   Some nausea and vomiting is expected for the first 24 hours after surgery. If you are unable to keep down fluids, or the nausea/vomiting continues beyond 24 hours.  4.   If you are unable to urinate within 8-12 hours after discharge from the hospital.  5.   A low-grade fever after surgery is normal. Notify the office if your temperature goes above 101 degrees.  6.   Any other concerns or questions you have regarding your surgery.      Kelli Mazariegos MD, FACS  [facility]  [address];   [address]; [address]  [place]  [address] [phone]

## 2024-03-21 NOTE — ANESTHESIA POSTPROCEDURE EVALUATION
Patient: Renita José    Procedure Summary       Date: 03/21/24 Room / Location: [facility] OR 01 / Virtual [facility] OR    Anesthesia Start: 0819 Anesthesia Stop:     Procedure: Laparoscopic cholecystectomy Diagnosis:       Calculus of gallbladder with chronic cholecystitis without obstruction      (Calculus of gallbladder with chronic cholecystitis without obstruction [K80.10])    Surgeons: Kelli Mazariegos MD Responsible Provider: DALI Anderson    Anesthesia Type: general ASA Status: 2            Anesthesia Type: general    Vitals Value Taken Time   /91 03/21/24 0946   Temp 98.0 03/21/24 0946   Pulse 81 03/21/24 0946   Resp 19 03/21/24 0946   SpO2 100 03/21/24 0946       Anesthesia Post Evaluation    Patient location during evaluation: PACU  Patient participation: complete - patient cannot participate  Level of consciousness: sleepy but conscious and awake  Pain score: 1  Pain management: satisfactory to patient  Airway patency: patent  Cardiovascular status: acceptable  Respiratory status: acceptable  Hydration status: acceptable  Postoperative Nausea and Vomiting: none      No notable events documented.

## 2024-03-21 NOTE — OP NOTE
Laparoscopic cholecystectomy Operative Note     Date: 3/21/2024  OR Location: [facility]    Name: Renita José, : 1995, Age: 28 y.o., MRN: [id number], Sex: female    Diagnosis  Pre-op Diagnosis     * Calculus of gallbladder with chronic cholecystitis without obstruction [K80.10] Post-op Diagnosis     * Calculus of gallbladder with chronic cholecystitis without obstruction [K80.10]     Procedures  Laparoscopic cholecystectomy  [id number] - [place] LAPAROSCOPY SURG CHOLECYSTECTOMY      Surgeons      * Kelli Mazariegos - Primary    Resident/Fellow/Other Assistant:  Surgeon(s) and Role: Aziza Villafuerte SA    Procedure Summary  Anesthesia: General  ASA: II  Anesthesia Staff: CRNA: JORGE Anderson-CRNA  Estimated Blood Loss: 5mL  Intra-op Medications:   Administrations occurring from 0800 to 1000 on 24:   Medication Name Total Dose   BUPivacaine-EPINEPHrine (Marcaine w/EPI) 0.5 %-1:200,000 injection 30 mL              Anesthesia Record               Intraprocedure I/O Totals       None           Specimen:   ID Type Source Tests Collected by Time   1 : gallbladder Tissue GALLBLADDER CHOLECYSTECTOMY SURGICAL PATHOLOGY EXAM Kelli Mazariegos MD 3/21/2024 0849        Staff:   Circulator: Shirley Willis RN  Scrub Person: Evelina Romero         Drains and/or Catheters: * None in log *    Tourniquet Times:         Implants:     Findings: Edematous and enlarged gallbladder consistent with acute cholecystitis.  Purulent fluid within the gallbladder consistent with empyema.    Indications: Renita José is an 28 y.o. female who is having surgery for Calculus of gallbladder with chronic cholecystitis without obstruction [K80.10].  She had had a right upper quadrant ultrasound showing gallstones and a slightly thickened gallbladder wall.  She had been treated for UTI with oral antibiotics.  She has completed her antibiotic course, and now presents for surgery for her gallbladder.  The benefits and  risk of a laparoscopic cholecystectomy were reviewed with the patient, and she signed consent.    The patient was seen in the preoperative area. The risks, benefits, complications, treatment options, non-operative alternatives, expected recovery and outcomes were discussed with the patient. The possibilities of reaction to medication, pulmonary aspiration, injury to surrounding structures, bleeding, recurrent infection, the need for additional procedures, failure to diagnose a condition, and creating a complication requiring transfusion or operation were discussed with the patient. The patient concurred with the proposed plan, giving informed consent.  The site of surgery was properly noted/marked if necessary per policy. The patient has been actively warmed in preoperative area. Preoperative antibiotics have been ordered and given within 1 hours of incision. Venous thrombosis prophylaxis have been ordered including bilateral sequential compression devices    Procedure Details:   The patient was brought into the operating room and was nolvia in the supine position. After placement under general anesthesia, PARMJIT hose and SCDs were placed on bilateral lower extremities. The abdomen was prepped with ChloraPrep and draped in the usual sterile fashion. A pause was taken and the correct patient and procedure were identified.    After injection of half percent Marcaine with epinephrine, a 1 cm periumbilical incision was made just above the level of the umbilicus. This was carried down to the level of the anterior fascia which was grasped with Kochars and opened under direct visualization.  After placement of the Narayanan port, and confirmation of intra-abdominal placement, the abdomen was insufflated up to 15 mmHg. On quick examination with the scope, there was no evidence of any injury during entry. The parts of the large and small bowel that were visualized all appeared within normal limits. The pelvis region could not be  seen. No obvious ventral wall hernias. Attention now was placed towards the right upper quadrant. The liver appeared normal. The patient now was placed in reverse Trendelenburg with slight right-sided up. All other ports were placed after local anesthesia was injected. A 5 mm port was placed into the epigastrium, and two 5mm ports were placed into the right upper quadrant.    At this time the gallbladder was identified, grasped and retracted in a cephalad position.  The gallbladder was found to be extremely edematous and elongated.  An attempt was made to decompress the gallbladder.  A small amount of purulent fluid was removed from the gallbladder, but the gallbladder was difficult to decompress.  The infundibulum of the gallbladder was grasped and retracted out laterally. The peritoneum was opened up around the infundibulum of the gallbladder both anteriorly and posteriorly using blunt dissection. Eventually 2 tubular structures each measuring less than 3 mm were identified as the cystic duct and the cystic artery.  The cystic artery appeared to be crossing slightly anterior to the cystic duct, and of note the associated lymph node was high up on the gallbladder.  Therefore, the lymph node was taken with the gallbladder specimen.  The undersurface of the gallbladder was dissected away from the gallbladder fossa bed, and there did not appear to be any other tubular structures leading up to the level of the gallbladder. At this time the cystic duct and the cystic artery were clipped using a 5 mm clip applier, and both were cut. The gallbladder then was removed from the gallbladder fossa bed using cautery.  A small posterior artery was identified about a third of the way up the gallbladder fossa bed.  This was clipped before cutting.  The dissection of the gallbladder off the gallbladder fossa bed took a significant amount of time due to the edema and the size of the gallbladder.  Once the gallbladder was off of the  gallbladder fossa bed, it was removed from the intra-abdominal cavity through the periumbilical port with an Endo Catch bag.  It was necessary to open up the umbilical incision in order to remove the large gallbladder with associated large stones.    The abdomen was reinsufflated and the area of the gallbladder fossa bed was inspected. There was no bleeding from the liver bed. The clips appeared in good position. The area both above and below the liver then was irrigated until clear. The ports then were removed under direct visualization, without any evidence of bleeding from the port sites. The pneumoperitoneum was released and suctioned out. The fascia of the periumbilical port was closed using 2 figure-of-eight stitches of 0 Vicryl sutures. The remaining Marcaine was injected into the fascia of the periumbilical port. All wounds were closed using interrupted stitches of 4-0 Vicryl and dressed with Steri-Strips and Band-Aids. The patient tolerated the procedure well.    Counts: Correct ×2.  Complications: None.  Drains: None.    Complications:  None; patient tolerated the procedure well.    Disposition: PACU - hemodynamically stable.  Condition: stable         Additional Details:     Attending Attestation: I performed the procedure.    Kelli Mazariegos  Phone Number: [phone]

## 2024-03-21 NOTE — ANESTHESIA PROCEDURE NOTES
Airway  Date/Time: 3/21/2024 8:39 AM  Urgency: elective    Airway not difficult    Staffing  Performed: CRNA   Authorized by: DALI Anderson    Performed by: DALI Anderson  Patient location during procedure: OR    Indications and Patient Condition  Indications for airway management: anesthesia  Spontaneous Ventilation: absent  Sedation level: deep  Preoxygenated: yes  Patient position: sniffing  MILS maintained throughout  Mask difficulty assessment: 1 - vent by mask  No planned trial extubation    Final Airway Details  Final airway type: endotracheal airway      Successful airway: ETT  Cuffed: yes   Successful intubation technique: direct laryngoscopy  Endotracheal tube insertion site: oral  Blade: Ida  Blade size: #3  ETT size (mm): 7.0  Cormack-Lehane Classification: grade I - full view of glottis  Placement verified by: chest auscultation, capnometry and palpation of cuff   Measured from: teeth  ETT to teeth (cm): 22  Number of attempts at approach: 1  Ventilation between attempts: none  Number of other approaches attempted: 0

## 2024-03-21 NOTE — ANESTHESIA PREPROCEDURE EVALUATION
Patient: Renita José    Procedure Information       Date/Time: 03/21/24 0800    Procedure: Laparoscopic cholecystectomy - 8 AM; 1.5 hours    Location: [facility] OR 01 / [facility]    Surgeons: Kelli Mazariegos MD            Relevant Problems   Anesthesia (within normal limits)      /Renal   (+) UTI (urinary tract infection)      GI/Hepatic   (+) Calculus of gallbladder with chronic cholecystitis without obstruction      Infectious Disease   (+) Trichomoniasis   (+) UTI (urinary tract infection)       Clinical information reviewed:   Tobacco  Allergies  Meds  Problems  Med Hx  Surg Hx   Fam Hx  Soc   Hx        NPO Detail:  NPO/Void Status  Date of Last Liquid: 03/20/24  Time of Last Liquid: 2100  Date of Last Solid: 03/20/24  Time of Last Solid: 2100  Time of Last Void: 0656         Physical Exam    Airway  Mallampati: I  TM distance: >3 FB  Neck ROM: full     Cardiovascular - normal exam     Dental - normal exam     Pulmonary - normal exam     Abdominal - normal exam         Anesthesia Plan    History of general anesthesia?: yes  History of complications of general anesthesia?: no    ASA 2     general     The patient is not a current smoker.    intravenous induction   Postoperative administration of opioids is intended.  Anesthetic plan and risks discussed with patient.  Use of blood products discussed with patient who.    Plan discussed with CRNA.

## 2024-03-24 LAB
LABORATORY COMMENT REPORT: NORMAL
PATH REPORT.FINAL DX SPEC: NORMAL
PATH REPORT.GROSS SPEC: NORMAL
PATH REPORT.RELEVANT HX SPEC: NORMAL
PATH REPORT.TOTAL CANCER: NORMAL

## 2024-03-26 ENCOUNTER — APPOINTMENT (OUTPATIENT)
Dept: PRIMARY CARE | Facility: CLINIC | Age: 29
End: 2024-03-26
Payer: COMMERCIAL

## 2024-04-03 ENCOUNTER — OFFICE VISIT (OUTPATIENT)
Dept: SURGERY | Facility: CLINIC | Age: 29
End: 2024-04-03
Payer: COMMERCIAL

## 2024-04-03 VITALS
HEART RATE: 103 BPM | HEIGHT: 63 IN | WEIGHT: 173.8 LBS | BODY MASS INDEX: 30.79 KG/M2 | SYSTOLIC BLOOD PRESSURE: 115 MMHG | DIASTOLIC BLOOD PRESSURE: 73 MMHG | OXYGEN SATURATION: 97 %

## 2024-04-03 DIAGNOSIS — K80.10 CALCULUS OF GALLBLADDER WITH CHRONIC CHOLECYSTITIS WITHOUT OBSTRUCTION: Primary | ICD-10-CM

## 2024-04-03 PROCEDURE — 99024 POSTOP FOLLOW-UP VISIT: CPT | Performed by: SURGERY

## 2024-04-03 ASSESSMENT — ENCOUNTER SYMPTOMS
DIARRHEA: 0
DYSURIA: 0
CONFUSION: 0
AGITATION: 0
CHILLS: 0
WEAKNESS: 0
MYALGIAS: 0
HEMATURIA: 0
FREQUENCY: 0
ARTHRALGIAS: 0
SPEECH DIFFICULTY: 0
VOMITING: 0
FEVER: 0
SHORTNESS OF BREATH: 0
NAUSEA: 0
ABDOMINAL PAIN: 1
EYE PAIN: 0
HEADACHES: 0
FLANK PAIN: 0
EYE REDNESS: 0
COUGH: 0
FATIGUE: 0
CONSTIPATION: 1
POLYPHAGIA: 0
WOUND: 0
BRUISES/BLEEDS EASILY: 0

## 2024-04-03 NOTE — LETTER
April 3, 2024     Patient: Renita José   YOB: 1995   Date of Visit: 4/3/2024       To Whom It May Concern:    It is my medical opinion that Renita José may return to work on 4/18/2024 .  She was out on medical leave due to surgical intervention on 3/21/2024.  If possible, she is to avoid excessive heavy lifting for 2 weeks upon return back to work.  Then, she may return back to all activity as tolerated.    If you have any questions or concerns, please don't hesitate to call.         Sincerely,        Kelli Mazariegos MD    CC: No Recipients

## 2024-04-03 NOTE — PROGRESS NOTES
GENERAL SURGERY OFFICE NOTE    Patient: Renita José    Age: 28 y.o.   Gender: female    MRN: [id number]    PCP: No Assigned PCP Generic Provider, MD        SUBJECTIVE     Chief Complaint  Follow-up (Patient is here for a 2 week post op laparoscopic cholecystectomy. Patient states that she is healing well.)       LAURYN Maravilla returns to the office for 2-week postop check after undergoing a laparoscopic cholecystectomy surgery.  At the time of her surgery, she was found to have a very enlarged gallbladder full of stones.  Her surgery was completed laparoscopically.  Postoperatively, she took about 4-5 of the Norco tablets, but has not taken any pain medication in more than a week now.  She has noticed her abdominal pain is significantly improved compared to her preoperative status.  She is back to eating and drinking well without any nausea or vomiting.  She even ate pizza and did not have any problems.  She initially had some urgency with her bowel movements, but was not having any diarrhea.  She feels that her bowel function is now back to baseline.  No fevers.  No drainage or bleeding from her incisions.    ROS  Review of Systems   Constitutional:  Negative for chills, fatigue and fever.   HENT:  Negative for congestion, ear pain and hearing loss.    Eyes:  Negative for pain and redness.   Respiratory:  Negative for cough and shortness of breath.    Cardiovascular:  Negative for chest pain and leg swelling.   Gastrointestinal:  Positive for abdominal pain and constipation. Negative for diarrhea, nausea and vomiting.   Endocrine: Negative for polyphagia.   Genitourinary:  Negative for dysuria, flank pain, frequency and hematuria.   Musculoskeletal:  Negative for arthralgias and myalgias.   Skin:  Negative for rash and wound.   Allergic/Immunologic: Negative for immunocompromised state.   Neurological:  Negative for speech difficulty, weakness and headaches.   Hematological:  Does not bruise/bleed easily.  "  Psychiatric/Behavioral:  Negative for agitation and confusion.           HISTORY     Past Medical History:   Diagnosis Date    Calculus of gallbladder with chronic cholecystitis without obstruction 2024    Other specified health status     No pertinent past medical history        Past Surgical History:   Procedure Laterality Date     SECTION, CLASSIC      CHOLECYSTECTOMY  2024    Laparoscopic cholecystectomy        No family history on file.     No Known Allergies     Social History     Tobacco Use   Smoking Status Never   Smokeless Tobacco Never        Social History     Substance and Sexual Activity   Alcohol Use Not Currently        HOME MEDICATIONS  Current Outpatient Medications   Medication Instructions    HYDROcodone-acetaminophen (Norco) 7.5-325 mg tablet 1 tablet, oral, Every 6 hours PRN    ibuprofen 600 mg tablet oral    ondansetron (Zofran) 4 mg tablet 1-2 tablets, oral, Every 8 hours PRN    PNV151-iron-FA-o3-dha-epa-fish (Prenatal Multi-DHA,with vit K,) 27 mg iron-800 mcg-260 mg capsule 1 capsule, oral, Daily    Vestura, 28, 3-0.02 mg tablet TAKE 1 TABLET BY MOUTH 1 TIME EACH DAY.          OBJECTIVE   Last Recorded Vitals.  Blood pressure 115/73, pulse 103, height 1.6 m (5' 3\"), weight 78.8 kg (173 lb 12.8 oz), last menstrual period 2024, SpO2 97 %.     PHYSICAL EXAM  Physical Exam   General: Well-developed, well-nourished and in no acute distress.  Head: Normocephalic. Atraumatic.  Neck/thyroid: Neck is supple.   Eyes: Pupils equal round and reactive to light. Conjunctiva normal.  No icterus.  ENMT: No masses or deformity of external nose. External ears without masses.  Respiratory/Chest:  Normal respiratory effort.  Cardiovascular: Regular rate and rhythm.   Abdomen: Soft, nontender, nondistended. No hernias.  Nonfunctional umbilical ring site just above the umbilicus.  Laparoscopic incisions healing well without any evidence of infection or hernia.  No right upper quadrant " tenderness.  Musculoskeletal: Joints and limbs are grossly normal. Normal gait. Normal range of motion of major joints.  Neuro: Oriented to person, place and time. No obvious neurological deficit. Motor strength grossly normal.  Psych: Normal mood and affect.    RESULTS   Pathology  FINAL DIAGNOSIS   A. GALLBLADDER, CHOLECYSTECTOMY:    -- CHOLELITHIASIS AND CHRONIC CHOLECYSTITIS  -- BENIGN LYMPH NODE   Electronically signed by Morgan Sykes MD on 3/24/2024 at 1737         ASSESSMENT / PLAN   Diagnoses and all orders for this visit:  Calculus of gallbladder with chronic cholecystitis without obstruction      Plan  1.  Activity as tolerated.  2.  May return to a regular diet.  However, discussed that greasy and fatty foods may cause GI upset and/or diarrhea.  Most likely over the next 3 months this will resolve on its own.  However, if diarrhea/bowel urgency persists beyond 3 months postop, the patient is recommended to contact my office for further evaluation.  3.  Interestingly, after her cholecystectomy surgery, all of her abdominal pain has resolved (including the left-sided abdominal pain).  No further evaluation is needed.  4.  The patient is referred back to their primary care physician, but may be referred back to my office for any further surgical needs.  5.  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  She feels that she can do her job, but there are concerns for repetitive lifting and repetitively getting in and out of the truck that she will be putting herself at an increased risk of developing a hernia at her surgical sites.  Since she will be 3 weeks out, she may return back to work on 4/18/2024, but recommended that she avoid any heavy lifting for at least another 1 to 2 weeks.      Kelli Mazariegos MD, FACS  [facility] Surgery  [address] [place] [address];   [address]  [place]  [address] [phone]

## 2024-04-03 NOTE — PATIENT INSTRUCTIONS
1.  You may gradually increase your physical activity as tolerated.  For the next 3 months, avoid any activity that causes pain at your incision sites.  2.  Diet as tolerated.  Remember greasy and fatty foods can cause abdominal cramps and/or diarrhea.  3.  Note provided that you are cleared to return back to work on 4/18/2024.  However, avoid heavy lifting at work for at least 2 weeks.  4.  If you have any other questions or concerns regarding your gallbladder surgery, please call Dr. Mazariegos's office.  [phone]

## 2024-04-24 ENCOUNTER — TELEPHONE (OUTPATIENT)
Dept: SURGERY | Facility: CLINIC | Age: 29
End: 2024-04-24
Payer: COMMERCIAL

## 2024-04-24 NOTE — TELEPHONE ENCOUNTER
Patient had a laparoscopic cholecystectomy done 3/21/24. Patient is wondering if she is cleared to go back to work without restrictions. Please advise.

## 2024-05-23 ENCOUNTER — APPOINTMENT (OUTPATIENT)
Dept: PRIMARY CARE | Facility: CLINIC | Age: 29
End: 2024-05-23
Payer: COMMERCIAL

## 2024-08-12 ENCOUNTER — APPOINTMENT (OUTPATIENT)
Dept: PRIMARY CARE | Facility: CLINIC | Age: 29
End: 2024-08-12
Payer: COMMERCIAL

## 2024-08-12 VITALS
BODY MASS INDEX: 29.23 KG/M2 | HEART RATE: 82 BPM | RESPIRATION RATE: 20 BRPM | TEMPERATURE: 99.5 F | OXYGEN SATURATION: 100 % | HEIGHT: 63 IN | WEIGHT: 165 LBS | SYSTOLIC BLOOD PRESSURE: 124 MMHG | DIASTOLIC BLOOD PRESSURE: 89 MMHG

## 2024-08-12 PROCEDURE — 4500999001 HC ED NO CHARGE

## 2024-08-12 ASSESSMENT — PAIN DESCRIPTION - PAIN TYPE: TYPE: ACUTE PAIN

## 2024-08-12 ASSESSMENT — PAIN DESCRIPTION - FREQUENCY: FREQUENCY: CONSTANT/CONTINUOUS

## 2024-08-12 ASSESSMENT — PAIN SCALES - GENERAL: PAINLEVEL_OUTOF10: 6

## 2024-08-12 ASSESSMENT — PAIN DESCRIPTION - ORIENTATION: ORIENTATION: RIGHT

## 2024-08-12 ASSESSMENT — PAIN DESCRIPTION - LOCATION: LOCATION: MOUTH

## 2024-08-12 ASSESSMENT — COLUMBIA-SUICIDE SEVERITY RATING SCALE - C-SSRS
6. HAVE YOU EVER DONE ANYTHING, STARTED TO DO ANYTHING, OR PREPARED TO DO ANYTHING TO END YOUR LIFE?: NO
2. HAVE YOU ACTUALLY HAD ANY THOUGHTS OF KILLING YOURSELF?: NO
1. IN THE PAST MONTH, HAVE YOU WISHED YOU WERE DEAD OR WISHED YOU COULD GO TO SLEEP AND NOT WAKE UP?: NO

## 2024-08-12 ASSESSMENT — PAIN DESCRIPTION - DESCRIPTORS: DESCRIPTORS: ACHING

## 2024-08-12 ASSESSMENT — PAIN - FUNCTIONAL ASSESSMENT: PAIN_FUNCTIONAL_ASSESSMENT: 0-10

## 2024-08-13 ENCOUNTER — HOSPITAL ENCOUNTER (EMERGENCY)
Facility: HOSPITAL | Age: 29
Discharge: LEFT BEFORE DISCHARGE | End: 2024-08-13
Payer: COMMERCIAL

## 2024-08-13 ENCOUNTER — PHARMACY VISIT (OUTPATIENT)
Dept: PHARMACY | Facility: CLINIC | Age: 29
End: 2024-08-13
Payer: MEDICAID

## 2024-08-13 ENCOUNTER — HOSPITAL ENCOUNTER (EMERGENCY)
Facility: HOSPITAL | Age: 29
Discharge: HOME | End: 2024-08-13
Payer: COMMERCIAL

## 2024-08-13 VITALS
OXYGEN SATURATION: 98 % | SYSTOLIC BLOOD PRESSURE: 139 MMHG | TEMPERATURE: 98.4 F | RESPIRATION RATE: 18 BRPM | HEIGHT: 63 IN | DIASTOLIC BLOOD PRESSURE: 85 MMHG | HEART RATE: 76 BPM | BODY MASS INDEX: 29.23 KG/M2

## 2024-08-13 DIAGNOSIS — K04.7 DENTAL ABSCESS: Primary | ICD-10-CM

## 2024-08-13 PROCEDURE — RXMED WILLOW AMBULATORY MEDICATION CHARGE

## 2024-08-13 PROCEDURE — 99283 EMERGENCY DEPT VISIT LOW MDM: CPT

## 2024-08-13 RX ORDER — PENICILLIN V POTASSIUM 500 MG/1
500 TABLET, FILM COATED ORAL 4 TIMES DAILY
Qty: 40 TABLET | Refills: 0 | Status: SHIPPED | OUTPATIENT
Start: 2024-08-13 | End: 2024-08-23

## 2024-08-13 RX ORDER — NAPROXEN 500 MG/1
500 TABLET ORAL
Qty: 17 TABLET | Refills: 0 | Status: SHIPPED | OUTPATIENT
Start: 2024-08-13

## 2024-08-13 ASSESSMENT — LIFESTYLE VARIABLES
EVER HAD A DRINK FIRST THING IN THE MORNING TO STEADY YOUR NERVES TO GET RID OF A HANGOVER: NO
HAVE YOU EVER FELT YOU SHOULD CUT DOWN ON YOUR DRINKING: NO
HAVE PEOPLE ANNOYED YOU BY CRITICIZING YOUR DRINKING: NO
EVER FELT BAD OR GUILTY ABOUT YOUR DRINKING: NO
TOTAL SCORE: 0

## 2024-08-13 ASSESSMENT — PAIN DESCRIPTION - ORIENTATION: ORIENTATION: RIGHT;UPPER

## 2024-08-13 ASSESSMENT — PAIN - FUNCTIONAL ASSESSMENT: PAIN_FUNCTIONAL_ASSESSMENT: 0-10

## 2024-08-13 ASSESSMENT — PAIN DESCRIPTION - DESCRIPTORS: DESCRIPTORS: ACHING

## 2024-08-13 ASSESSMENT — PAIN SCALES - GENERAL: PAINLEVEL_OUTOF10: 6

## 2024-08-13 ASSESSMENT — PAIN DESCRIPTION - LOCATION: LOCATION: MOUTH

## 2024-08-13 ASSESSMENT — PAIN DESCRIPTION - FREQUENCY: FREQUENCY: CONSTANT/CONTINUOUS

## 2024-08-13 ASSESSMENT — PAIN DESCRIPTION - PAIN TYPE: TYPE: ACUTE PAIN

## 2024-08-13 NOTE — Clinical Note
Renita José was seen and treated in our emergency department on 8/13/2024.  She may return to work on 08/14/2024.       If you have any questions or concerns, please don't hesitate to call.      Hugh Frazier, APRKARISSA-CNP

## 2024-08-13 NOTE — ED PROVIDER NOTES
Chief Complaint   Patient presents with   • Dental Pain       HPI       28 year old female presents to the Emergency Department today complaining of awakening this am with right upper jaw pain and edema. Describes the pain as throbbing in nature, constant, non-radiating, and varies in intensity. Denies any associated fever, chills, headache, neck pain, chest pain, shortness of breath, abdominal pain, nausea, vomiting, diarrhea, constipation, or urinary symptoms.       History provided by:  Patient             Patient History   Past Medical History:   Diagnosis Date   • Calculus of gallbladder with chronic cholecystitis without obstruction 2024   • Other specified health status     No pertinent past medical history     Past Surgical History:   Procedure Laterality Date   •  SECTION, CLASSIC     • CHOLECYSTECTOMY  2024    Laparoscopic cholecystectomy     No family history on file.  Social History     Tobacco Use   • Smoking status: Never   • Smokeless tobacco: Never   Vaping Use   • Vaping status: Every Day   • Substances: Nicotine   Substance Use Topics   • Alcohol use: Not Currently   • Drug use: Yes     Types: Marijuana           Physical Exam  Constitutional:       Appearance: Normal appearance.   HENT:      Head: Normocephalic.      Right Ear: External ear normal.      Left Ear: External ear normal.      Nose: Nose normal.      Mouth/Throat:      Lips: Pink.      Mouth: Mucous membranes are moist.      Dentition: No dental caries.      Pharynx: Oropharynx is clear. Uvula midline. No oropharyngeal exudate or posterior oropharyngeal erythema.      Tonsils: No tonsillar exudate. 1+ on the right. 1+ on the left.        Comments: No definitive abscess.  Eyes:      Conjunctiva/sclera: Conjunctivae normal.      Pupils: Pupils are equal, round, and reactive to light.   Cardiovascular:      Rate and Rhythm: Normal rate and regular rhythm.      Heart sounds: No murmur heard.     No friction rub. No  gallop.   Pulmonary:      Effort: Pulmonary effort is normal. No respiratory distress.      Breath sounds: Normal breath sounds. No stridor. No wheezing, rhonchi or rales.   Neurological:      Mental Status: She is alert.         Labs Reviewed - No data to display    No orders to display            ED Course & MDM   Diagnoses as of 08/13/24 1047   Dental abscess           Medical Decision Making  Patient was seen and evaluated by myself. There is no definitive abscess. Given prescriptions for Naprosyn and Penicillin. No contraindications to NSAIDs are noted. Follow up with their dentist in 3 days. Return if worse in any way. Discharged in stable condition with computer instructions.    Diagnostic Impression:     1. Acute dental infection    2. Prescription therapy            Your medication list        ASK your doctor about these medications        Instructions Last Dose Given Next Dose Due   ibuprofen 600 mg tablet           ondansetron 4 mg tablet  Commonly known as: Zofran           Prenatal Multi-DHA(with vit K) 27 mg iron-800 mcg-260 mg capsule  Generic drug: PNV151-iron-FA-o3-dha-epa-fish           Vestura (28) 3-0.02 mg tablet  Generic drug: drospirenone-ethinyl estradioL                      Procedure  Procedures     Hugh Frazier, JORGE-CNP  08/13/24 1043

## 2025-02-07 ENCOUNTER — APPOINTMENT (OUTPATIENT)
Dept: PRIMARY CARE | Facility: CLINIC | Age: 30
End: 2025-02-07
Payer: COMMERCIAL

## 2025-02-14 ENCOUNTER — APPOINTMENT (OUTPATIENT)
Dept: PRIMARY CARE | Facility: CLINIC | Age: 30
End: 2025-02-14
Payer: COMMERCIAL

## (undated) DEVICE — Device

## (undated) DEVICE — GLOVE, PROTEXIS PI CLASSIC, SZ-7.0, PF, LF

## (undated) DEVICE — RETRIEVAL SYSTEM, MONARCH, 10MM DISP ENDOSCOPIC

## (undated) DEVICE — DRAPE, SHEET, ENDOSCOPY, GENERAL, FENESTRATED, ARMBOARD COVER, 98 X 123.5 IN, DISPOSABLE, LF, STERILE

## (undated) DEVICE — SCOPE WARMER, LAPAROSCOPE, BAG ONLY, LF

## (undated) DEVICE — SOLUTION, IRRIGATION, SODIUM CHLORIDE 0.9%, 1000 ML, POUR BOTTLE

## (undated) DEVICE — SUTURE, VICRYL, 0, 27 IN, UR-6, VIOLET

## (undated) DEVICE — SUTURE, VICRYL, 4-0, 18 IN, UNDYED BR PS-2

## (undated) DEVICE — TOWEL PACK, STERILE, 4/PACK, BLUE

## (undated) DEVICE — TROCAR SYSTEM, BALLOON, KII GELPORT, 12 X 100MM

## (undated) DEVICE — CLIP, ENDO APPLIER LIGAMAX 5MM

## (undated) DEVICE — BANDAGE, ADHESIVE, FLEXIBLE FABRIC, 1 X 3

## (undated) DEVICE — TROCAR, KII OPTICAL BLADELESS 5MM Z THREAD 100MM LNGTH

## (undated) DEVICE — CAUTERY, PENCIL, PUSH BUTTON, SMOKE EVAC, 70MM

## (undated) DEVICE — PAD, GROUNDING, ELECTROSURGICAL, W/9 FT CABLE, POLYHESIVE II, ADULT, LF

## (undated) DEVICE — SLEEVE, KII, Z-THREAD, 5X100CM

## (undated) DEVICE — COVER HANDLE LIGHT, STERIS, BLUE, STERILE

## (undated) DEVICE — SOLUTION, IRRIGATION, USP, SODIUM CHLORIDE 0.9%, 3000 ML

## (undated) DEVICE — SYRINGE, 10 CC, LUER LOCK

## (undated) DEVICE — APPLICATOR, CHLORAPREP, W/ORANGE TINT, 26ML

## (undated) DEVICE — ELECTRODE, OPTI2 LAPAROSCOPIC SPATULA, CURVED

## (undated) DEVICE — ASSEMBLY, STRYKER FLOW 2, SUCTION IRRIGATOR, WITH TIP